# Patient Record
Sex: FEMALE | Race: WHITE | HISPANIC OR LATINO | Employment: UNEMPLOYED | URBAN - METROPOLITAN AREA
[De-identification: names, ages, dates, MRNs, and addresses within clinical notes are randomized per-mention and may not be internally consistent; named-entity substitution may affect disease eponyms.]

---
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
## 2020-01-22 ASSESSMENT — ENCOUNTER SYMPTOMS
NAUSEA: 0
DIARRHEA: 0
VOMITING: 0
FEVER: 0
HEADACHES: 0
ABDOMINAL PAIN: 1
SHORTNESS OF BREATH: 0

## 2020-04-10 ENCOUNTER — GYNECOLOGY VISIT (OUTPATIENT)
Dept: OBGYN | Facility: CLINIC | Age: 20
End: 2020-04-10
Payer: COMMERCIAL

## 2020-04-10 VITALS — DIASTOLIC BLOOD PRESSURE: 69 MMHG | WEIGHT: 198 LBS | BODY MASS INDEX: 31.96 KG/M2 | SYSTOLIC BLOOD PRESSURE: 135 MMHG

## 2020-04-10 DIAGNOSIS — N91.2 AMENORRHEA: ICD-10-CM

## 2020-04-10 PROCEDURE — 76857 US EXAM PELVIC LIMITED: CPT | Performed by: OBSTETRICS & GYNECOLOGY

## 2020-04-10 PROCEDURE — 99214 OFFICE O/P EST MOD 30 MIN: CPT | Mod: 25 | Performed by: OBSTETRICS & GYNECOLOGY

## 2020-04-10 NOTE — PROGRESS NOTES
CC: Missed menses    Jodee Marin is a 19 y.o.  who presents presents due to missed menses. Patient's last menstrual period was 2020.  She was not using anything for birthcontrol.  This is not a planned but is a desired pregnancy.   Reports she has been feeling good thus far in pregnancy. She reports nausea- no vomiting, reports headache, denies dysuria, denies  vaginal bleeding/spotting, and denies contractions/cramping.    Partner: Rayo    Review of systems:  Pertinent positives documented in HPI and all other systems reviewed & are negative    GYN History:  Patient's last menstrual period was 2020. Menarche @13.  Menses regular, lasting 5 days, not particularly heavy.  Only 19, never had a pap!  No history of cone biopsy, LEEP or any other cervical, uterine or gynecologic surgery. No history of sexually transmitted diseases.  Currently sexually active with her partner.      OB History:    OB History    Para Term  AB Living   2 1 1     1   SAB TAB Ectopic Molar Multiple Live Births           0 1      # Outcome Date GA Lbr Wicho/2nd Weight Sex Delivery Anes PTL Lv   2 Current            1 Term 19 39w2d  3.595 kg (7 lb 14.8 oz) M Vag-Spont EPI N NÉSTOR       All PMH, PSH, allergies, social history and FH reviewed and updated today:  Past Medical History:  History reviewed. No pertinent past medical history.    Past Surgical History:  History reviewed. No pertinent surgical history.    Medications:   Current Outpatient Medications Ordered in Epic   Medication Sig Dispense Refill   • ibuprofen (MOTRIN) 600 MG Tab Take 1 Tab by mouth every 6 hours as needed. 30 Tab 0   • Prenatal MV-Min-Fe Fum-FA-DHA (PRENATAL 1 PO) Take 1 tablet by mouth every day.       No current [facility]-ordered facility-administered medications on file.        Allergies: Patient has no known allergies.    Social History:  Social History     Socioeconomic History   • Marital status: Single     Spouse name: Not  on file   • Number of children: Not on file   • Years of education: Not on file   • Highest education level: Not on file   Occupational History   • Not on file   Social Needs   • Financial resource strain: Not on file   • Food insecurity     Worry: Not on file     Inability: Not on file   • Transportation needs     Medical: Not on file     Non-medical: Not on file   Tobacco Use   • Smoking status: Never Smoker   • Smokeless tobacco: Never Used   Substance and Sexual Activity   • Alcohol use: No   • Drug use: No   • Sexual activity: Never   Lifestyle   • Physical activity     Days per week: Not on file     Minutes per session: Not on file   • Stress: Not on file   Relationships   • Social connections     Talks on phone: Not on file     Gets together: Not on file     Attends Orthodox service: Not on file     Active member of club or organization: Not on file     Attends meetings of clubs or organizations: Not on file     Relationship status: Not on file   • Intimate partner violence     Fear of current or ex partner: Not on file     Emotionally abused: Not on file     Physically abused: Not on file     Forced sexual activity: Not on file   Other Topics Concern   • Behavioral problems Not Asked   • Interpersonal relationships Not Asked   • Sad or not enjoying activities Not Asked   • Suicidal thoughts Not Asked   • Poor school performance Not Asked   • Reading difficulties Not Asked   • Speech difficulties Not Asked   • Writing difficulties Not Asked   • Inadequate sleep Not Asked   • Excessive TV viewing Not Asked   • Excessive video game use Not Asked   • Inadequate exercise Not Asked   • Sports related Not Asked   • Poor diet Not Asked   • Family concerns for drug/alcohol abuse Not Asked   • Poor oral hygiene Not Asked   • Bike safety Not Asked   • Family concerns vehicle safety Not Asked   Social History Narrative   • Not on file       Family History:  Family History   Problem Relation Age of Onset   • No Known  Problems Mother    • No Known Problems Father            Objective:   Vitals:  /69   Wt 89.8 kg (198 lb)   Body mass index is 31.96 kg/m². (Goal BM I>18 <25)  Exam:  General: appears stated age, is in no apparent distress, is well developed and well nourished  Head: normocephalic, non-tender  Neck: no thyromegaly  Abdomen: Bowel sounds positive, nondistended, soft, nontender x4, no rebound or guarding. No organomegaly. No masses.   Female GYN: normal female external genitalia without lesions  Skin: No rashes, or ulcers or lesions seen  Psychiatric: appropriate affect, alert and oriented x3, intact judgment and insight.    Procedure:  Abdominal US performed by me and per my read:    Indication: amenorrhea.     Findings: prado intrauterine pregnancy @ 12w6d by CRL.  Positive fetal cardiac activity @ 168 BPM. No adnexal masses. No free fluid in the cul-de-sac.    Impression: viable IUP @ 12w6d.  DAKOTAH by US of 10/17/20.    No results found for this or any previous visit (from the past 336 hour(s)).   Pregnancy exam/test positive    A/P:   19 y.o.  here for amenorrhea  1. Amenorrhea         #Missed menses - amenorrhea due to pregnancy.  US today is c/w LMP. DAKOTAH of 10/14/20.  Discussed pregnancy with patient who is happy.    --Normal pregnancy s/s discussed  --Advised prenatal vitamins, healthy well rounded diet, adequate hydration, and continued exercise.    #Cervical cancer screening not indicated.    #Will order prenatal labs and any additional imaging/testing needed at new OB visit  #SAB precautions discussed.  #Follow up in 2-4 weeks for new OB visit.    30 minutes were spent in face-to-face patient contact with patient, greater than 50% of which was was spent in counseling and coordination of care for her newly diagnosed pregnancy including medical and surgical options of care.    NAYA

## 2020-04-24 ENCOUNTER — HOSPITAL ENCOUNTER (OUTPATIENT)
Facility: MEDICAL CENTER | Age: 20
End: 2020-04-24
Attending: OBSTETRICS & GYNECOLOGY
Payer: COMMERCIAL

## 2020-04-24 ENCOUNTER — INITIAL PRENATAL (OUTPATIENT)
Dept: OBGYN | Facility: CLINIC | Age: 20
End: 2020-04-24
Payer: COMMERCIAL

## 2020-04-24 VITALS — BODY MASS INDEX: 31.96 KG/M2 | WEIGHT: 198 LBS

## 2020-04-24 DIAGNOSIS — Z34.80 SUPERVISION OF OTHER NORMAL PREGNANCY, ANTEPARTUM: ICD-10-CM

## 2020-04-24 LAB
C TRACH DNA SPEC QL NAA+PROBE: NEGATIVE
N GONORRHOEA DNA SPEC QL NAA+PROBE: NEGATIVE
SPECIMEN SOURCE: NORMAL

## 2020-04-24 PROCEDURE — 87591 N.GONORRHOEAE DNA AMP PROB: CPT

## 2020-04-24 PROCEDURE — 59401 PR NEW OB VISIT: CPT | Performed by: OBSTETRICS & GYNECOLOGY

## 2020-04-24 PROCEDURE — 87491 CHLMYD TRACH DNA AMP PROBE: CPT

## 2020-04-24 NOTE — PROGRESS NOTES
C/c: new OB    HPI:  The patient is a 19 y.o.  at 15-2 weeks here for new OB visit.    She presents for her new obstetric visit.    She denies fetal movement, denies vaginal bleeding, denies leakage of fluid, denies contractions.     She denies nausea/vomiting, headaches, or urinary symptoms.      LMP was 2020.   Menarche 12yo, monthly, lasting 5 days.    Last pap: not indicated until 22yo.     OB History    Para Term  AB Living   2 1 1     1   SAB TAB Ectopic Molar Multiple Live Births           0 1      # Outcome Date GA Lbr Wicho/2nd Weight Sex Delivery Anes PTL Lv   2 Current            1 Term 19 39w2d  3.595 kg (7 lb 14.8 oz) M Vag-Spont EPI N NÉSTOR     No past medical history on file.  No past surgical history on file.  Social History     Socioeconomic History   • Marital status: Single     Spouse name: Not on file   • Number of children: Not on file   • Years of education: Not on file   • Highest education level: Not on file   Occupational History   • Not on file   Social Needs   • Financial resource strain: Not on file   • Food insecurity     Worry: Not on file     Inability: Not on file   • Transportation needs     Medical: Not on file     Non-medical: Not on file   Tobacco Use   • Smoking status: Never Smoker   • Smokeless tobacco: Never Used   Substance and Sexual Activity   • Alcohol use: No   • Drug use: No   • Sexual activity: Yes   Lifestyle   • Physical activity     Days per week: Not on file     Minutes per session: Not on file   • Stress: Not on file   Relationships   • Social connections     Talks on phone: Not on file     Gets together: Not on file     Attends Roman Catholic service: Not on file     Active member of club or organization: Not on file     Attends meetings of clubs or organizations: Not on file     Relationship status: Not on file   • Intimate partner violence     Fear of current or ex partner: Not on file     Emotionally abused: Not on file     Physically  abused: Not on file     Forced sexual activity: Not on file   Other Topics Concern   • Behavioral problems Not Asked   • Interpersonal relationships Not Asked   • Sad or not enjoying activities Not Asked   • Suicidal thoughts Not Asked   • Poor school performance Not Asked   • Reading difficulties Not Asked   • Speech difficulties Not Asked   • Writing difficulties Not Asked   • Inadequate sleep Not Asked   • Excessive TV viewing Not Asked   • Excessive video game use Not Asked   • Inadequate exercise Not Asked   • Sports related Not Asked   • Poor diet Not Asked   • Family concerns for drug/alcohol abuse Not Asked   • Poor oral hygiene Not Asked   • Bike safety Not Asked   • Family concerns vehicle safety Not Asked   Social History Narrative   • Not on file     Family History   Problem Relation Age of Onset   • No Known Problems Mother    • No Known Problems Father      Allergies:   Allergies as of 2020   • (No Known Allergies)       PE:    Wt 89.8 kg (198 lb)     General: no apparent distress, is well developed and well nourished  Head: normocephalic, atraumatic  Neck: neck is supple, non-tender, no thyromegaly, full range of motion  CVS: regular rate and rhythm, no peripheral edema  Lungs: Normal respiratory effort. Clear to auscultation bilaterally  Abdomen: Bowel sounds positive, nondistended, soft, nontender x4, no rebound or guarding.  Female GYN: normal female external genitalia without lesions  vaginal discharge noted, normal vagina and normal vaginal tone, normal cervix, normal uterus, size and consistency  Skin: No rashes, or ulcers or lesions seen  Psychiatric: Patient shows appropriate affect, is alert and oriented x3, intact judgment and insight.    A/P:  19 y.o.  here for new OB visit.  Patient's last menstrual period was 2020..  She is here for her new obstetric visit.    DATING: 10/14/2020 which is consistent with LMP     1. Supervision of other normal pregnancy, antepartum   Chlamydia/GC PCR Urine Or Swab    PREG CNTR PRENATAL PN    US-OB 2ND 3RD TRI COMPLETE    AFP TETRA    CANCELED: AFP TETRA       - Ultrasound for dates and anatomy at 18-22 weeks.  - Screening: second trimester screening for Down Syndrome and neural tube defects offered. Discussed SM, CF, and Hg electrophoresis screening with patient - advised to call insurance for cost of testing. Patient accepts quad screening  - Referral to [facility] not indicated.  - New prenatal labs given.  - NOB packet given with ACOG prenatal book.  - Increase water intake and encouraged healthy nutrition. Encouraged moderate exercise may continue into final trimester.   - Continue prenatal vitamins.  - Follow-up in 6 weeks.   - SAB precautions educated.  - Oriented to practice model and number of expected visits in the future.  - Advised to get flu vaccine during flu season

## 2020-05-12 ENCOUNTER — HOSPITAL ENCOUNTER (OUTPATIENT)
Dept: LAB | Facility: MEDICAL CENTER | Age: 20
End: 2020-05-12
Attending: OBSTETRICS & GYNECOLOGY
Payer: COMMERCIAL

## 2020-05-12 DIAGNOSIS — Z34.80 SUPERVISION OF OTHER NORMAL PREGNANCY, ANTEPARTUM: ICD-10-CM

## 2020-05-12 LAB
ABO GROUP BLD: NORMAL
APPEARANCE UR: ABNORMAL
BACTERIA #/AREA URNS HPF: ABNORMAL /HPF
BASOPHILS # BLD AUTO: 0.4 % (ref 0–1.8)
BASOPHILS # BLD: 0.04 K/UL (ref 0–0.12)
BILIRUB UR QL STRIP.AUTO: NEGATIVE
BLD GP AB SCN SERPL QL: NORMAL
COLOR UR: YELLOW
EOSINOPHIL # BLD AUTO: 0.26 K/UL (ref 0–0.51)
EOSINOPHIL NFR BLD: 2.3 % (ref 0–6.9)
EPI CELLS #/AREA URNS HPF: ABNORMAL /HPF
ERYTHROCYTE [DISTWIDTH] IN BLOOD BY AUTOMATED COUNT: 46.1 FL (ref 35.9–50)
GLUCOSE UR STRIP.AUTO-MCNC: NEGATIVE MG/DL
HBV SURFACE AG SER QL: ABNORMAL
HCT VFR BLD AUTO: 38.1 % (ref 37–47)
HGB BLD-MCNC: 13 G/DL (ref 12–16)
HIV 1+2 AB+HIV1 P24 AG SERPL QL IA: NORMAL
HYALINE CASTS #/AREA URNS LPF: ABNORMAL /LPF
IMM GRANULOCYTES # BLD AUTO: 0.03 K/UL (ref 0–0.11)
IMM GRANULOCYTES NFR BLD AUTO: 0.3 % (ref 0–0.9)
KETONES UR STRIP.AUTO-MCNC: ABNORMAL MG/DL
LEUKOCYTE ESTERASE UR QL STRIP.AUTO: ABNORMAL
LYMPHOCYTES # BLD AUTO: 2.77 K/UL (ref 1–4.8)
LYMPHOCYTES NFR BLD: 24.9 % (ref 22–41)
MCH RBC QN AUTO: 29.8 PG (ref 27–33)
MCHC RBC AUTO-ENTMCNC: 34.1 G/DL (ref 33.6–35)
MCV RBC AUTO: 87.4 FL (ref 81.4–97.8)
MICRO URNS: ABNORMAL
MONOCYTES # BLD AUTO: 0.45 K/UL (ref 0–0.85)
MONOCYTES NFR BLD AUTO: 4 % (ref 0–13.4)
NEUTROPHILS # BLD AUTO: 7.57 K/UL (ref 2–7.15)
NEUTROPHILS NFR BLD: 68.1 % (ref 44–72)
NITRITE UR QL STRIP.AUTO: NEGATIVE
NRBC # BLD AUTO: 0 K/UL
NRBC BLD-RTO: 0 /100 WBC
PH UR STRIP.AUTO: 5.5 [PH] (ref 5–8)
PLATELET # BLD AUTO: 255 K/UL (ref 164–446)
PMV BLD AUTO: 9.7 FL (ref 9–12.9)
PROT UR QL STRIP: NEGATIVE MG/DL
RBC # BLD AUTO: 4.36 M/UL (ref 4.2–5.4)
RBC # URNS HPF: ABNORMAL /HPF
RBC UR QL AUTO: NEGATIVE
RH BLD: NORMAL
RUBV AB SER QL: >500 IU/ML
SP GR UR STRIP.AUTO: 1.03
TREPONEMA PALLIDUM IGG+IGM AB [PRESENCE] IN SERUM OR PLASMA BY IMMUNOASSAY: ABNORMAL
UROBILINOGEN UR STRIP.AUTO-MCNC: 0.2 MG/DL
WBC # BLD AUTO: 11.1 K/UL (ref 4.8–10.8)
WBC #/AREA URNS HPF: ABNORMAL /HPF

## 2020-05-12 PROCEDURE — 86901 BLOOD TYPING SEROLOGIC RH(D): CPT

## 2020-05-12 PROCEDURE — 81001 URINALYSIS AUTO W/SCOPE: CPT

## 2020-05-12 PROCEDURE — 86762 RUBELLA ANTIBODY: CPT

## 2020-05-12 PROCEDURE — 87389 HIV-1 AG W/HIV-1&-2 AB AG IA: CPT

## 2020-05-12 PROCEDURE — 87340 HEPATITIS B SURFACE AG IA: CPT

## 2020-05-12 PROCEDURE — 86900 BLOOD TYPING SEROLOGIC ABO: CPT

## 2020-05-12 PROCEDURE — 87086 URINE CULTURE/COLONY COUNT: CPT

## 2020-05-12 PROCEDURE — 81511 FTL CGEN ABNOR FOUR ANAL: CPT

## 2020-05-12 PROCEDURE — 86780 TREPONEMA PALLIDUM: CPT

## 2020-05-12 PROCEDURE — 36415 COLL VENOUS BLD VENIPUNCTURE: CPT

## 2020-05-12 PROCEDURE — 86850 RBC ANTIBODY SCREEN: CPT

## 2020-05-12 PROCEDURE — 85025 COMPLETE CBC W/AUTO DIFF WBC: CPT

## 2020-05-14 LAB
BACTERIA UR CULT: NORMAL
SIGNIFICANT IND 70042: NORMAL
SITE SITE: NORMAL
SOURCE SOURCE: NORMAL

## 2020-05-15 LAB
# FETUSES US: NORMAL
AFP MOM SERPL: 0.55
AFP SERPL-MCNC: 19 NG/ML
AGE - REPORTED: 20.2 YR
CURRENT SMOKER: NO
FAMILY MEMBER DISEASES HX: NO
GA METHOD: NORMAL
GA: NORMAL WK
HCG MOM SERPL: 0.59
HCG SERPL-ACNC: NORMAL IU/L
HX OF HEREDITARY DISORDERS: NO
IDDM PATIENT QL: NO
INHIBIN A MOM SERPL: 0.75
INHIBIN A SERPL-MCNC: 110 PG/ML
INTEGRATED SCN PATIENT-IMP: NORMAL
PATHOLOGY STUDY: NORMAL
SPECIMEN DRAWN SERPL: NORMAL
U ESTRIOL MOM SERPL: 0.7
U ESTRIOL SERPL-MCNC: 1 NG/ML

## 2020-05-28 ENCOUNTER — HOSPITAL ENCOUNTER (OUTPATIENT)
Dept: RADIOLOGY | Facility: MEDICAL CENTER | Age: 20
End: 2020-05-28
Attending: OBSTETRICS & GYNECOLOGY
Payer: COMMERCIAL

## 2020-05-28 DIAGNOSIS — Z34.80 SUPERVISION OF OTHER NORMAL PREGNANCY, ANTEPARTUM: ICD-10-CM

## 2020-05-28 PROCEDURE — 76805 OB US >/= 14 WKS SNGL FETUS: CPT

## 2020-05-29 DIAGNOSIS — Z34.80 SUPERVISION OF OTHER NORMAL PREGNANCY, ANTEPARTUM: ICD-10-CM

## 2020-06-02 ENCOUNTER — ROUTINE PRENATAL (OUTPATIENT)
Dept: OBGYN | Facility: CLINIC | Age: 20
End: 2020-06-02
Payer: COMMERCIAL

## 2020-06-02 VITALS — SYSTOLIC BLOOD PRESSURE: 123 MMHG | DIASTOLIC BLOOD PRESSURE: 84 MMHG | WEIGHT: 208 LBS | BODY MASS INDEX: 33.57 KG/M2

## 2020-06-02 DIAGNOSIS — Z3A.20 20 WEEKS GESTATION OF PREGNANCY: ICD-10-CM

## 2020-06-02 PROCEDURE — 90040 PR PRENATAL FOLLOW UP: CPT | Performed by: OBSTETRICS & GYNECOLOGY

## 2020-06-02 NOTE — PROGRESS NOTES
Chief complaint: Return OB visit    S: Pt presents for routine OB follow up.  No contractions, vaginal bleeding, or leaking fluids. Good fetal movement.    Complaints    Questions answered.    O: /84   Wt 94.3 kg (208 lb)   LMP 2020   BMI 33.57 kg/m²   Patients' weight gain, fluid intake and exercise level discussed.  Vitals, fundal height , fetal position, and FHR reviewed on flowsheet    Lab:No results found for this or any previous visit (from the past 336 hour(s)).    A/P:  19 y.o.  at 20w6d presents for routine obstetric follow-up.  Size equals dates and/or scan    1.  Continue prenatal vitamins.  2.  Begin kick counts at 28 weeks.  3.  Exercise at least 30 minutes daily.  4.  Drink at least 2 Liters of water daily  5.  Follow-up in 4 weeks.    Labs and ultrasound discussed with patient.  Will have repeat ultrasound secondary to poor visualization of facial structures.    All questions answered

## 2020-06-12 ENCOUNTER — HOSPITAL ENCOUNTER (OUTPATIENT)
Dept: RADIOLOGY | Facility: MEDICAL CENTER | Age: 20
End: 2020-06-12
Attending: OBSTETRICS & GYNECOLOGY
Payer: COMMERCIAL

## 2020-06-12 DIAGNOSIS — Z34.80 SUPERVISION OF OTHER NORMAL PREGNANCY, ANTEPARTUM: ICD-10-CM

## 2020-06-12 PROCEDURE — 76815 OB US LIMITED FETUS(S): CPT

## 2020-07-08 ENCOUNTER — ROUTINE PRENATAL (OUTPATIENT)
Dept: OBGYN | Facility: CLINIC | Age: 20
End: 2020-07-08
Payer: COMMERCIAL

## 2020-07-08 VITALS — SYSTOLIC BLOOD PRESSURE: 125 MMHG | DIASTOLIC BLOOD PRESSURE: 67 MMHG | WEIGHT: 215 LBS | BODY MASS INDEX: 34.7 KG/M2

## 2020-07-08 DIAGNOSIS — Z3A.26 26 WEEKS GESTATION OF PREGNANCY: ICD-10-CM

## 2020-07-08 PROCEDURE — 90040 PR PRENATAL FOLLOW UP: CPT | Performed by: OBSTETRICS & GYNECOLOGY

## 2020-07-08 NOTE — PROGRESS NOTES
MERCEDES:  26w0d    Pt reports doing well.  Denies vaginal bleeding, contractions, LOF.  Reports +FM.    LMP 2020   gen: AAO, NAD  FHTs: 140  FH: 28    A/P: 19 y.o.  @ 26w0d  gume by LMP c/w 10 wk scan. O+/ ab neg, RI, HepB neg, T.pall neg, UA neg.   Quad screening neg.  --short interval pregnancy!  Ultrasounds:   20 at 19w6d. EFW 320g, (32%tile), post placenta without previa, 3VC, normal insertion. COY normal, normal anatomy survey but lips and nose not seen. Repeat US  showed normal structures.  Immunizations: tdap [ ]  3rd tri labs: [ ] - ordered   GBS: [ ]  Delivery Plan: [ ]  Accepting of transfusion:[ ]  PPBCM: [ ] - READDRESS

## 2020-07-08 NOTE — PROGRESS NOTES
Pt here today for OB follow up @ 26w0d  Pt states denies VB & LOF  Reports +FM  Good # [phone]   Pharmacy Confirmed.

## 2020-07-15 ENCOUNTER — TELEPHONE (OUTPATIENT)
Dept: OBGYN | Facility: CLINIC | Age: 20
End: 2020-07-15

## 2020-07-15 NOTE — TELEPHONE ENCOUNTER
Pt LM on VM wanting to discuss a concern  Called pt back, reports she has been having white discharge with foul odor, thinks she may have BV again.  Offered appt for tomorrow at 10:30, pt agreed and scheduled

## 2020-07-30 ENCOUNTER — HOSPITAL ENCOUNTER (OUTPATIENT)
Facility: MEDICAL CENTER | Age: 20
End: 2020-07-30
Attending: OBSTETRICS & GYNECOLOGY | Admitting: OBSTETRICS & GYNECOLOGY
Payer: COMMERCIAL

## 2020-07-30 ENCOUNTER — APPOINTMENT (OUTPATIENT)
Dept: RADIOLOGY | Facility: MEDICAL CENTER | Age: 20
End: 2020-07-30
Attending: OBSTETRICS & GYNECOLOGY
Payer: COMMERCIAL

## 2020-07-30 ENCOUNTER — TELEPHONE (OUTPATIENT)
Dept: OBGYN | Facility: CLINIC | Age: 20
End: 2020-07-30

## 2020-07-30 VITALS
BODY MASS INDEX: 34.55 KG/M2 | HEIGHT: 66 IN | SYSTOLIC BLOOD PRESSURE: 109 MMHG | DIASTOLIC BLOOD PRESSURE: 55 MMHG | OXYGEN SATURATION: 96 % | TEMPERATURE: 97 F | WEIGHT: 215 LBS | RESPIRATION RATE: 18 BRPM | HEART RATE: 69 BPM

## 2020-07-30 LAB
APPEARANCE UR: CLEAR
BASOPHILS # BLD AUTO: 0.2 % (ref 0–1.8)
BASOPHILS # BLD: 0.02 K/UL (ref 0–0.12)
CANDIDA DNA VAG QL PROBE+SIG AMP: NEGATIVE
COLOR UR AUTO: ABNORMAL
EOSINOPHIL # BLD AUTO: 0.1 K/UL (ref 0–0.51)
EOSINOPHIL NFR BLD: 1.1 % (ref 0–6.9)
ERYTHROCYTE [DISTWIDTH] IN BLOOD BY AUTOMATED COUNT: 44.8 FL (ref 35.9–50)
G VAGINALIS DNA VAG QL PROBE+SIG AMP: POSITIVE
GLUCOSE UR QL STRIP.AUTO: NEGATIVE MG/DL
HCT VFR BLD AUTO: 37.1 % (ref 37–47)
HGB BLD-MCNC: 12.3 G/DL (ref 12–16)
IMM GRANULOCYTES # BLD AUTO: 0.04 K/UL (ref 0–0.11)
IMM GRANULOCYTES NFR BLD AUTO: 0.5 % (ref 0–0.9)
KETONES UR QL STRIP.AUTO: NEGATIVE MG/DL
LEUKOCYTE ESTERASE UR QL STRIP.AUTO: ABNORMAL
LYMPHOCYTES # BLD AUTO: 2.48 K/UL (ref 1–4.8)
LYMPHOCYTES NFR BLD: 28.1 % (ref 22–41)
MCH RBC QN AUTO: 30.2 PG (ref 27–33)
MCHC RBC AUTO-ENTMCNC: 33.2 G/DL (ref 33.6–35)
MCV RBC AUTO: 91.2 FL (ref 81.4–97.8)
MONOCYTES # BLD AUTO: 0.56 K/UL (ref 0–0.85)
MONOCYTES NFR BLD AUTO: 6.3 % (ref 0–13.4)
NEUTROPHILS # BLD AUTO: 5.64 K/UL (ref 2–7.15)
NEUTROPHILS NFR BLD: 63.8 % (ref 44–72)
NITRITE UR QL STRIP.AUTO: NEGATIVE
NRBC # BLD AUTO: 0 K/UL
NRBC BLD-RTO: 0 /100 WBC
PH UR STRIP.AUTO: 7 [PH] (ref 5–8)
PLATELET # BLD AUTO: 203 K/UL (ref 164–446)
PMV BLD AUTO: 9.7 FL (ref 9–12.9)
PROT UR QL STRIP: NEGATIVE MG/DL
RBC # BLD AUTO: 4.07 M/UL (ref 4.2–5.4)
RBC UR QL AUTO: ABNORMAL
SP GR UR STRIP.AUTO: 1.02 (ref 1–1.03)
T VAGINALIS DNA VAG QL PROBE+SIG AMP: NEGATIVE
WBC # BLD AUTO: 8.8 K/UL (ref 4.8–10.8)

## 2020-07-30 PROCEDURE — 76816 OB US FOLLOW-UP PER FETUS: CPT

## 2020-07-30 PROCEDURE — 36415 COLL VENOUS BLD VENIPUNCTURE: CPT

## 2020-07-30 PROCEDURE — 59025 FETAL NON-STRESS TEST: CPT | Mod: 26 | Performed by: OBSTETRICS & GYNECOLOGY

## 2020-07-30 PROCEDURE — 87591 N.GONORRHOEAE DNA AMP PROB: CPT

## 2020-07-30 PROCEDURE — 87660 TRICHOMONAS VAGIN DIR PROBE: CPT

## 2020-07-30 PROCEDURE — 99214 OFFICE O/P EST MOD 30 MIN: CPT | Mod: 25 | Performed by: OBSTETRICS & GYNECOLOGY

## 2020-07-30 PROCEDURE — 59025 FETAL NON-STRESS TEST: CPT

## 2020-07-30 PROCEDURE — 87480 CANDIDA DNA DIR PROBE: CPT

## 2020-07-30 PROCEDURE — 81002 URINALYSIS NONAUTO W/O SCOPE: CPT

## 2020-07-30 PROCEDURE — 87491 CHLMYD TRACH DNA AMP PROBE: CPT

## 2020-07-30 PROCEDURE — 85025 COMPLETE CBC W/AUTO DIFF WBC: CPT

## 2020-07-30 PROCEDURE — 87510 GARDNER VAG DNA DIR PROBE: CPT

## 2020-07-30 ASSESSMENT — PAIN SCALES - GENERAL: PAINLEVEL: 0 - NO PAIN

## 2020-07-30 NOTE — PROGRESS NOTES
Pt is a ; DAKOTAH of 10/14; making her 29w1d. Pt here reporting VB since last evening after having intercourse. Pt reports she first noticed the bleeding last evening so she called here and spoke with one of our nurses. The nurse told her to put on a maxi pad and monitor her bleeding. If she soaked the pad in an hour or continued to bleed throught the night she was told to come in to be evaluated. Pt reported waking up this morning and feeling a gush. When pt went to evaluate she reported a good amount of red blood on her pad and decided to come in and get evaluated. Pt denies LOF, UCS and reports +FM. EFM and TOCO applied. VSS.     [facility] Resident Yvette Ngo notified. WIll consult with Dr Horn and update RN.        Orders received for CBC and a OB growth US with rule out abruption. Will update MD with results.     1545 Us at bedside. Report given to Tamica VERDUZCO.

## 2020-07-30 NOTE — TELEPHONE ENCOUNTER
Pt LM on VM c/o vaginal bleeding  Called pt back (pt is at L&D now being monitored) pt states she is having bleeding when she wipes and already being seen at L&D

## 2020-07-30 NOTE — H&P
[facility] LABOR AND DELIVERY HISTORY AND PHYSICAL    PATIENT ID:  NAME:  Jodee Marin  MRN:               [id number]  YOB: 2000    CC:  Jodee Marin is a 19 y.o. female  at 29w1d who presents with episodes of vaginal bleeding after sexual intercourse    HPI:  Jodee Marin is a 19 y.o. female  at 29w1d. Patient's last menstrual period was 2020. Estimated Date of Delivery: 10/14/20.  Patient noted increased discharge over the past 2 weeks, was unable to make an earlier appointment until this coming week.  Had sex last night, noted a gush of blood after sex, called labor and delivery and was told to place a pad and monitor the bleeding.  If persisted to come into the hospital.  She wore a pad and did not have much bleeding afterwards, then woke up this morning and had a gush of blood again.  It happened 1 more time before she came to labor and delivery for evaluation.  Patient presents complaining of no uterine contractions, with no loss of fluid.  Positive fetal movement.  Positive vaginal bleeding.  Pregnancy has been uncomplicated.    ROS: Patient denies any fever chills, nausea, vomiting, headache, chest pain, shortness of breath, or dysuria or unusual swelling of hands or feet.     Prenatal Care: Obtained at [facility], 1st visit 2020 with 3 total visits.  Third trimester BPs were approximately 124/74.  Total weight gain 7.7 kg during the pregnancy.    Prenatal Labs:   HepBsAg: Non-Reactive HIV: Non-Reactive Rubella: Immune   RPR: Non-Reactive PAP: N/A GBS: Negative   GC/CT: Neg/Neg  Quad Screen: N/A   No results for input(s): WBC, RBC, HEMOGLOBIN, HEMATOCRIT, MCV, MCH, RDW, PLATELETCT, MPV, NEUTSPOLYS, LYMPHOCYTES, MONOCYTES, EOSINOPHILS, BASOPHILS, RBCMORPHOLO in the last 72 hours.  No results for input(s): SODIUM, POTASSIUM, CHLORIDE, CO2, GLUCOSE, BUN, CPKTOTAL in the last 72 hours.       IMAGING:  OB ultrasound: Posterior Placenta without Previa on Ultrasound from  2020      POB Hx:  OB History    Para Term  AB Living   2 1 1     1   SAB TAB Ectopic Molar Multiple Live Births           0 1      # Outcome Date GA Lbr [place]/2nd Weight Sex Delivery Anes PTL Lv   2 Current            1 Term 19 39w2d  3.595 kg (7 lb 14.8 oz) M Vag-Spont EPI N NÉSTOR       PMH/Problem List:    No past medical history on file.  Patient Active Problem List    Diagnosis Date Noted   • 20 weeks gestation of pregnancy 2020       Current Outpatient Medications:  No current facility-administered medications on file prior to encounter.      Current Outpatient Medications on File Prior to Encounter   Medication Sig Dispense Refill   • ibuprofen (MOTRIN) 600 MG Tab Take 1 Tab by mouth every 6 hours as needed. (Patient not taking: Reported on 2020) 30 Tab 0   • Prenatal MV-Min-Fe Fum-FA-DHA (PRENATAL 1 PO) Take 1 tablet by mouth every day.         PSH:    No past surgical history on file.    Allergies:   No Known Allergies    SH:  Social History     Socioeconomic History   • Marital status: Single     Spouse name: Not on file   • Number of children: Not on file   • Years of education: Not on file   • Highest education level: Not on file   Occupational History   • Not on file   Social Needs   • Financial resource strain: Not on file   • Food insecurity     Worry: Not on file     Inability: Not on file   • Transportation needs     Medical: Not on file     Non-medical: Not on file   Tobacco Use   • Smoking status: Never Smoker   • Smokeless tobacco: Never Used   Substance and Sexual Activity   • Alcohol use: No   • Drug use: No   • Sexual activity: Yes   Lifestyle   • Physical activity     Days per week: Not on file     Minutes per session: Not on file   • Stress: Not on file   Relationships   • Social connections     Talks on phone: Not on file     Gets together: Not on file     Attends [employer] service: Not on file     Active member of club or organization: Not on file     Attends  "meetings of clubs or organizations: Not on file     Relationship status: Not on file   • Intimate partner violence     Fear of current or ex partner: Not on file     Emotionally abused: Not on file     Physically abused: Not on file     Forced sexual activity: Not on file   Other Topics Concern   • Behavioral problems Not Asked   • Interpersonal relationships Not Asked   • Sad or not enjoying activities Not Asked   • Suicidal thoughts Not Asked   • Poor school performance Not Asked   • Reading difficulties Not Asked   • Speech difficulties Not Asked   • Writing difficulties Not Asked   • Inadequate sleep Not Asked   • Excessive TV viewing Not Asked   • Excessive video game use Not Asked   • Inadequate exercise Not Asked   • Sports related Not Asked   • Poor diet Not Asked   • Family concerns for drug/alcohol abuse Not Asked   • Poor oral hygiene Not Asked   • Bike safety Not Asked   • Family concerns vehicle safety Not Asked   Social History Narrative   • Not on file         PHYSICAL EXAM:  Vitals:    07/30/20 1300   Weight: 97.5 kg (215 lb)   Height: 1.676 m (5' 6\")     No data recorded.    General: No acute distress, resting comfortably in bed.  HEENT: normocephalic, nontraumatic, PERRLA, EOMI  Cardiovascular: Heart RRR with no murmurs, rubs or gallops. Distal Pulses 2+  Respiratory: symmetric chest expansion, lungs CTA bilaterally with no wheezes rales or rhonci  Abdomen: gravid, nontender    Pelvic: Normal external genitalia normal vagina, cervix obscured by large amount of yellow frothy discharge, when cultures obtained, cervix was extremely friable and bled filling the vaginal vault, pressure was performed and 12 dossil swabs were used and 6 4x4 raytec to control bleeding, once silver nitrate and monsel's solution was applied to the cervix, it became hemostatic.  The cervix was observed and no bleeding was seen with excellent hemostasis.     Musculoskeletal: strength 5/5 in four extremities  Neuro: non focal " with no numbness, tingling or changes in sensation    Jodee Marin, a  at 29w1d with an DAKOTAH of 10/14/2020, by Last Menstrual Period, was seen at LABOR & DELIVERY [facility] for a nonstress test.    Nonstress Test  Reason for NST: Bleeding  Variability: Moderate  Decelerations: None  Accelerations: Yes  Acoustic Stimulator: No  Baseline: 135  Uterine Irritability: Yes  Contractions: Not present  Nonstress Test Interpretation  $ Nonstress Test Interpretation - Fetus A: Reactive  NST Interpreted By: MARIO Murphy RN  Indication for NST: vaginal bleeding    NST performed and per my read:    Reactive NST, cat. 1  TOCO monitor: some irritability       Us-ob Limited Growth Follow Up   Is The Patient Pregnant? Yes    Result Date: 2020 3:29 PM HISTORY/REASON FOR EXAM:  Vaginal bleeding; IUP 29.1 pt experiencing VB, needs growth scan and rule out abruption TECHNIQUE/EXAM DESCRIPTION: OB limited ultrasound. COMPARISON:  Obstetrical ultrasound 2020 FINDINGS: Fetal Lie:  Vertex LMP:  2020 Clinical DAKOTAH by LMP:  10/14/2020 Placenta (Location):  Posterior Placenta Previa: No Placental Grade: I Amniotic Fluid Volume:  COY = 23.46 cm Fetal Heart Rate:  143 bpm Cervical Length:  4.12 cm transabdominal No maternal adnexal mass is identified. Fetal Biometry BPD    7.42 cm, 29 weeks, 6 days HC    25.76 cm, 28 weeks, 0 days AC    25.21 cm, 29 weeks, 3 days Femur Length    5.27 cm, 28 weeks, 1 days Humerus Length    4.91 cm, 29 weeks, 0 days EGA by this US:  28 weeks, 6 days DAKOTAH by this US: 10/16/2020 DAKOTAH by 1st US:  10/16/2020 Estimated Fetal Weight:  1293 grams EFW Percentile: 27% Comments: No retroplacental hemorrhage is seen.     Single intrauterine pregnancy of an estimated gestational age of 28 weeks, 6 days with an estimated date of delivery of 10/16/2020. Dates are concordant No placental abruption is identified Upper normal COY, 23.5 cm INTERPRETING LOCATION: [facility], [facility], [facility]      A/P:  Intrauterine pregancy at 29w1d weeks here for episodes of vaginal bleeding after sexual intercourse with cervicitis       - Placental abruption ruled out with ultrasound, explained to the patient's there could be a smaller bleed that was not identified however given the cervix findings today do not think this is is likely  - Possibly could have cervicitis given the discharge and the friability of the cervix noted, explained to the patient that the combination of pregnancy and infection on the cervix can make it bleed when touched  - Recommend nothing in the vagina for the next week and return to the hospital if continues having significant vaginal bleeding  - Cramping may occur due to the irritation from the cervix, but if persistent return for rule out labor  - Gonorrhea and chlamydia as well as vaginal pathogens ordered, will follow up and treat as indicated in the office  -Okay for discharge home, follow-up closely next week in the office

## 2020-07-31 NOTE — PROGRESS NOTES
1545-report received from QI Villarreal RN, care assumed, POC discussed, US at bedside, cbc drawn  1700-TC Dr Horn, reviewed labs and US, states that she will be here to do spec exam when she can  1745-Dr Horn here, spec exam done, bleeding noted from cervix, bleeding corrected by Dr Horn  1815-discharge orders received  1830-pt discharged home with PTL precautions and instructions to come back if bleeding resumes after treatment, verbalized understanding, left ambulatory with SO

## 2020-08-07 ENCOUNTER — ROUTINE PRENATAL (OUTPATIENT)
Dept: OBGYN | Facility: CLINIC | Age: 20
End: 2020-08-07
Payer: COMMERCIAL

## 2020-08-07 VITALS — SYSTOLIC BLOOD PRESSURE: 125 MMHG | WEIGHT: 220 LBS | BODY MASS INDEX: 35.51 KG/M2 | DIASTOLIC BLOOD PRESSURE: 74 MMHG

## 2020-08-07 DIAGNOSIS — O09.893 SUPERVISION OF OTHER HIGH RISK PREGNANCIES, THIRD TRIMESTER: ICD-10-CM

## 2020-08-07 DIAGNOSIS — O46.93 VAGINAL BLEEDING IN PREGNANCY, THIRD TRIMESTER: ICD-10-CM

## 2020-08-07 DIAGNOSIS — N76.0 BV (BACTERIAL VAGINOSIS): ICD-10-CM

## 2020-08-07 DIAGNOSIS — B96.89 BV (BACTERIAL VAGINOSIS): ICD-10-CM

## 2020-08-07 PROCEDURE — 90040 PR PRENATAL FOLLOW UP: CPT | Performed by: OBSTETRICS & GYNECOLOGY

## 2020-08-07 RX ORDER — METRONIDAZOLE 7.5 MG/G
1 GEL VAGINAL
Qty: 1 EACH | Refills: 0 | Status: SHIPPED | OUTPATIENT
Start: 2020-08-07 | End: 2020-08-12

## 2020-08-07 NOTE — LETTER
"Count Your Baby's Movements  Another step to a healthy delivery    Jodee Marin              Dept: [phone]    How Many Weeks Pregnant? 30w2d    Date to Begin Countin2020              How to use this chart    One way for your physician to keep track of your baby's health is by knowing how often the baby moves (or \"kicks\") in your womb.  You can help your physician to do this by using this chart every day.    Every day, you should see how many hours it takes for your baby to move 10 times.  Start in the morning, as soon as you get up.    · First, write down the time your baby moves until you get to 10.  · Check off one box every time your baby moves until you get to 10.  · Write down the time you finished counting in the last column.  · Total how long it took to count up all 10 movements.  · Finally, fill in the box that shows how long this took.  After counting 10 movements, you no longer have to count any more that day.  The next morning, just start counting again as soon as you get up.    What should you call a \"movement\"?  It is hard to say, because it will feel different from one mother to another and from one pregnancy to the next.  The important thing is that you count the movements the same way throughout your pregnancy.  If you have more questions, you should ask your physician.    Count carefully every day!  SAMPLE:  Week 28    How many hours did it take to feel 10 movements?       Start  Time     1     2     3     4     5     6     7     8     9     10   Finish Time   Mon 8:20 ·  ·  ·  ·  ·  ·  ·  ·  ·  ·  11:40                  Sat               Sun                 IMPORTANT: You should contact your physician if it takes more than two hours for you to feel 10 movements.  Each morning, write down the time and start to count the movements of your baby.  Keep track by checking off one box every time you feel one movement.  When you have " "felt 10 \"kicks\", write down the time you finished counting in the last column.  Then fill in the   box (over the check adeline) for the number of hours it took.  Be sure to read the complete instructions on the previous page.            "

## 2020-08-07 NOTE — PROGRESS NOTES
S: Pt presents for routine OB follow up.  Patient is currently doing well without any complaints.  Reports good fetal movement.  No contractions, vaginal bleeding, or leakage of fluid.  Patient was seen a week ago on labor and delivery for profuse vaginal bleeding and was found to have a vascular bleeding in her cervix which was cauterized with Monsel solution.  Patient states she has not had any bleeding or discomfort since that time.  Questions answered.    O: /74   Wt 99.8 kg (220 lb)   LMP 01/08/2020   BMI 35.51 kg/m²   Patients' weight gain, fluid intake and exercise level discussed.  Vitals, fundal height , fetal position, and FHR reviewed on flowsheet    Lab:  Recent Results (from the past 336 hour(s))   POC UA    Collection Time: 07/30/20  2:55 PM   Result Value Ref Range    POC Color Bessie     POC Appearance Clear     POC Glucose Negative Negative mg/dL    POC Ketones Negative Negative mg/dL    POC Specific Gravity 1.020 1.005 - 1.030    POC Blood Large (A) Negative    POC Urine PH 7.0 5.0 - 8.0    POC Protein Negative Negative mg/dL    POC Nitrites Negative Negative    POC Leukocyte Esterase Moderate (A) Negative   CBC WITH DIFFERENTIAL    Collection Time: 07/30/20  4:09 PM   Result Value Ref Range    WBC 8.8 4.8 - 10.8 K/uL    RBC 4.07 (L) 4.20 - 5.40 M/uL    Hemoglobin 12.3 12.0 - 16.0 g/dL    Hematocrit 37.1 37.0 - 47.0 %    MCV 91.2 81.4 - 97.8 fL    MCH 30.2 27.0 - 33.0 pg    MCHC 33.2 (L) 33.6 - 35.0 g/dL    RDW 44.8 35.9 - 50.0 fL    Platelet Count 203 164 - 446 K/uL    MPV 9.7 9.0 - 12.9 fL    Neutrophils-Polys 63.80 44.00 - 72.00 %    Lymphocytes 28.10 22.00 - 41.00 %    Monocytes 6.30 0.00 - 13.40 %    Eosinophils 1.10 0.00 - 6.90 %    Basophils 0.20 0.00 - 1.80 %    Immature Granulocytes 0.50 0.00 - 0.90 %    Nucleated RBC 0.00 /100 WBC    Neutrophils (Absolute) 5.64 2.00 - 7.15 K/uL    Lymphs (Absolute) 2.48 1.00 - 4.80 K/uL    Monos (Absolute) 0.56 0.00 - 0.85 K/uL    Eos (Absolute)  0.10 0.00 - 0.51 K/uL    Baso (Absolute) 0.02 0.00 - 0.12 K/uL    Immature Granulocytes (abs) 0.04 0.00 - 0.11 K/uL    NRBC (Absolute) 0.00 K/uL   VAGINAL PATHOGENS DNA PANEL    Collection Time: 20  5:45 PM   Result Value Ref Range    Candida species DNA Probe Negative Negative    Trichamonas vaginalis DNA Probe Negative Negative    Gardnerella vaginalis DNA Probe POSITIVE (A) Negative   Chlamydia/GC PCR Urine Or Swab    Collection Time: 20  5:45 PM    Specimen: Genital   Result Value Ref Range    C. trachomatis by PCR Negative Negative    N. gonorrhoeae by PCR Negative Negative    Source Genital        A/P:  19 y.o.  at 30w2d presents for routine obstetric follow-up.  Doing well  Size equals dates   Patient had one episode of vaginal bleeding-bleeding has resolved  BV diagnosis discussed.  Treatment discussed-MetroGel prescribed.  Patient to continue pelvic rest until MetroGel therapy is completed.  Patient has not completed 28-week labs-lab slips reprinted and given.  Patient advised to complete labs within the next week    1.  Continue prenatal vitamins.  2.  Fetal kick counts daily discussed-instructions provided.  3.  Exercise at least 30 minutes daily.  4.  Drink at least 2L of water daily  5.  Pregnancy and  labor precautions educated.  6.  Follow-up in 2 weeks.  7.  GBS culture at 36 weeks discussed

## 2020-08-07 NOTE — PROGRESS NOTES
Pt here today for OB follow up @ 30w2d  Pt states denies VB and LOF   Reports +FM  Good # [phone]  Pharmacy Confirmed.  GABRIELE sheet given  Tdap next visit

## 2020-08-18 ENCOUNTER — ROUTINE PRENATAL (OUTPATIENT)
Dept: OBGYN | Facility: CLINIC | Age: 20
End: 2020-08-18
Payer: COMMERCIAL

## 2020-08-18 VITALS — DIASTOLIC BLOOD PRESSURE: 67 MMHG | WEIGHT: 222.4 LBS | BODY MASS INDEX: 35.9 KG/M2 | SYSTOLIC BLOOD PRESSURE: 129 MMHG

## 2020-08-18 DIAGNOSIS — Z3A.31 31 WEEKS GESTATION OF PREGNANCY: ICD-10-CM

## 2020-08-18 DIAGNOSIS — O09.893 SUPERVISION OF OTHER HIGH RISK PREGNANCIES, THIRD TRIMESTER: ICD-10-CM

## 2020-08-18 PROCEDURE — 90471 IMMUNIZATION ADMIN: CPT | Performed by: OBSTETRICS & GYNECOLOGY

## 2020-08-18 PROCEDURE — 90715 TDAP VACCINE 7 YRS/> IM: CPT | Performed by: OBSTETRICS & GYNECOLOGY

## 2020-08-18 PROCEDURE — 90040 PR PRENATAL FOLLOW UP: CPT | Performed by: OBSTETRICS & GYNECOLOGY

## 2020-08-18 NOTE — PROGRESS NOTES
Chief complaint return visit    S: Pt presents for routine OB follow up. Good fetal movement.  No contractions, vaginal bleeding, or leakage of fluid.    Questions answered.  Has not had 1 hour glucose testing done yet.  Has an appointment this Friday    O: /67   Wt 100.9 kg (222 lb 6.4 oz)   LMP 2020   BMI 35.90 kg/m²   Patients' weight gain, fluid intake and exercise level discussed.  Vitals, fundal height , fetal position, and FHR reviewed on flowsheet    Lab:No results found for this or any previous visit (from the past 336 hour(s)).    A/P:  20 y.o.  at 31w6d presents for routine obstetric follow-up.  Size equals dates and/or scan    1.  Continue prenatal vitamins.  2.  Fetal kick counts.  3.  Exercise at least 30 minutes daily.  4.  Drink at least 2L of water daily  5.  Labor precautions educated.  6.  Follow-up in 2 weeks.  7.  GBS at 36 weeks    Discussed with patient importance of glucose testing during pregnancy.  She will obtain testing as soon as possible    Tdap given today     all questions answered

## 2020-08-18 NOTE — PROGRESS NOTES
Pt. Here for OB/FU. Reports Good FM.   Good # [phone]  Pt states no complaints.   Pharmacy verified.   Tdap vaccine given today, right deltoid. Screening checklist reviewed with pt verified by Rachael CARRANZA   Pt states has appt for 1 HR GTT on 8/25/2020

## 2020-08-26 ENCOUNTER — HOSPITAL ENCOUNTER (OUTPATIENT)
Dept: LAB | Facility: MEDICAL CENTER | Age: 20
End: 2020-08-26
Attending: OBSTETRICS & GYNECOLOGY
Payer: COMMERCIAL

## 2020-08-26 DIAGNOSIS — Z3A.26 26 WEEKS GESTATION OF PREGNANCY: ICD-10-CM

## 2020-08-26 DIAGNOSIS — E74.39 GLUCOSE INTOLERANCE: ICD-10-CM

## 2020-08-26 LAB
ERYTHROCYTE [DISTWIDTH] IN BLOOD BY AUTOMATED COUNT: 44.4 FL (ref 35.9–50)
GLUCOSE 1H P 50 G GLC PO SERPL-MCNC: 170 MG/DL (ref 70–139)
HCT VFR BLD AUTO: 37.8 % (ref 37–47)
HGB BLD-MCNC: 12.6 G/DL (ref 12–16)
MCH RBC QN AUTO: 30.2 PG (ref 27–33)
MCHC RBC AUTO-ENTMCNC: 33.3 G/DL (ref 33.6–35)
MCV RBC AUTO: 90.6 FL (ref 81.4–97.8)
PLATELET # BLD AUTO: 201 K/UL (ref 164–446)
PMV BLD AUTO: 10.7 FL (ref 9–12.9)
RBC # BLD AUTO: 4.17 M/UL (ref 4.2–5.4)
TREPONEMA PALLIDUM IGG+IGM AB [PRESENCE] IN SERUM OR PLASMA BY IMMUNOASSAY: NORMAL
WBC # BLD AUTO: 8.3 K/UL (ref 4.8–10.8)

## 2020-08-26 PROCEDURE — 36415 COLL VENOUS BLD VENIPUNCTURE: CPT

## 2020-08-26 PROCEDURE — 86780 TREPONEMA PALLIDUM: CPT

## 2020-08-26 PROCEDURE — 85027 COMPLETE CBC AUTOMATED: CPT

## 2020-08-26 PROCEDURE — 82950 GLUCOSE TEST: CPT

## 2020-08-29 DIAGNOSIS — R73.02 IMPAIRED GLUCOSE TOLERANCE: ICD-10-CM

## 2020-08-31 DIAGNOSIS — E74.39 GLUCOSE INTOLERANCE: ICD-10-CM

## 2020-09-02 ENCOUNTER — ROUTINE PRENATAL (OUTPATIENT)
Dept: OBGYN | Facility: CLINIC | Age: 20
End: 2020-09-02
Payer: COMMERCIAL

## 2020-09-02 VITALS — BODY MASS INDEX: 36.15 KG/M2 | DIASTOLIC BLOOD PRESSURE: 68 MMHG | SYSTOLIC BLOOD PRESSURE: 119 MMHG | WEIGHT: 224 LBS

## 2020-09-02 DIAGNOSIS — Z34.83 ENCOUNTER FOR SUPERVISION OF OTHER NORMAL PREGNANCY, THIRD TRIMESTER: ICD-10-CM

## 2020-09-02 DIAGNOSIS — O99.810 ABNORMAL GLUCOSE AFFECTING PREGNANCY: ICD-10-CM

## 2020-09-02 PROBLEM — B96.89 BV (BACTERIAL VAGINOSIS): Status: RESOLVED | Noted: 2020-08-07 | Resolved: 2020-09-02

## 2020-09-02 PROBLEM — N76.0 BV (BACTERIAL VAGINOSIS): Status: RESOLVED | Noted: 2020-08-07 | Resolved: 2020-09-02

## 2020-09-02 PROBLEM — Z3A.31 31 WEEKS GESTATION OF PREGNANCY: Status: RESOLVED | Noted: 2020-06-02 | Resolved: 2020-09-02

## 2020-09-02 PROCEDURE — 90040 PR PRENATAL FOLLOW UP: CPT | Performed by: ADVANCED PRACTICE MIDWIFE

## 2020-09-02 NOTE — NON-PROVIDER
OB follow up   + fetal movement.  No VB, LOF or UC's.  Wt:224       BP: 119/68  Phone # [phone]  Preferred pharmacy confirmed.  3gtt will do before next appointment.

## 2020-09-02 NOTE — PROGRESS NOTES
Has patient been referred to outside provider?   no    Records available on chart?   n/a    SUBJECTIVE:  Pt is a 20 y.o.   at 34w0d  gestation. Presents today for follow-up prenatal care. Has not been seen in ER or L & D since last visit. Reports good  fetal movement.     Leaking of fluid?       no    Dysuria?       no    Headaches?      no    N/V?          no    Cramping/contractions?      no    Patient has not beein in able to obtain 3hr glucose test related to needing an appointment.     OBJECTIVE:   /68   Wt 101.6 kg (224 lb)   LMP 2020   BMI 36.15 kg/m²   Patients' weight gain, fluid intake and exercise level discussed.  Vitals, fundal height , fetal position, and FHR reviewed on flowsheet    Lab:  Recent Results (from the past 336 hour(s))   T.PALLIDUM AB EIA    Collection Time: 20  7:08 AM   Result Value Ref Range    Syphilis, Treponemal Qual Non-Reactive Non-Reactive   GLUCOSE 1HR GESTATIONAL    Collection Time: 20  7:08 AM   Result Value Ref Range    Glucose, Post Dose 170 (H) 70 - 139 mg/dL   CBC WITHOUT DIFFERENTIAL    Collection Time: 20  7:08 AM   Result Value Ref Range    WBC 8.3 4.8 - 10.8 K/uL    RBC 4.17 (L) 4.20 - 5.40 M/uL    Hemoglobin 12.6 12.0 - 16.0 g/dL    Hematocrit 37.8 37.0 - 47.0 %    MCV 90.6 81.4 - 97.8 fL    MCH 30.2 27.0 - 33.0 pg    MCHC 33.3 (L) 33.6 - 35.0 g/dL    RDW 44.4 35.9 - 50.0 fL    Platelet Count 201 164 - 446 K/uL    MPV 10.7 9.0 - 12.9 fL       ASSESSMENT/ PLAN:   - IUP at 34w0d    - S = D   -   Patient Active Problem List    Diagnosis Date Noted   • Supervision of other high risk pregnancies, third trimester 2020   • Vaginal bleeding in pregnancy, third trimester 2020         Briefly discussed birth control methods with patient. She will discuss with her partner when she desires to have another child. We briefly talked about progestin only methods as she desires to breastfeed.    Discussed Saturday labs with patient at  [address] location.    - S/sx pregnancy and labor warning signs vs general discomforts discussed  - Fetal movements and kick counts reviewed. Adequate hydration reinforced.  - Did discuss current COVID policies related to outpatient and inpatient visits.   - Anticipatory guidance provided.   - RTC in 2 weeks for routine prenatal care.

## 2020-09-05 ENCOUNTER — HOSPITAL ENCOUNTER (OUTPATIENT)
Dept: LAB | Facility: MEDICAL CENTER | Age: 20
End: 2020-09-05
Attending: OBSTETRICS & GYNECOLOGY
Payer: COMMERCIAL

## 2020-09-05 DIAGNOSIS — R73.02 IMPAIRED GLUCOSE TOLERANCE: ICD-10-CM

## 2020-09-05 LAB
GLUCOSE 1H P CHAL SERPL-MCNC: 173 MG/DL (ref 65–180)
GLUCOSE 2H P CHAL SERPL-MCNC: 131 MG/DL (ref 65–155)
GLUCOSE 3H P CHAL SERPL-MCNC: 131 MG/DL (ref 65–140)
GLUCOSE BS SERPL-MCNC: 78 MG/DL (ref 65–95)

## 2020-09-05 PROCEDURE — 36415 COLL VENOUS BLD VENIPUNCTURE: CPT

## 2020-09-05 PROCEDURE — 82951 GLUCOSE TOLERANCE TEST (GTT): CPT

## 2020-09-05 PROCEDURE — 82952 GTT-ADDED SAMPLES: CPT

## 2020-09-16 ENCOUNTER — HOSPITAL ENCOUNTER (OUTPATIENT)
Facility: MEDICAL CENTER | Age: 20
End: 2020-09-16
Attending: OBSTETRICS & GYNECOLOGY
Payer: COMMERCIAL

## 2020-09-16 ENCOUNTER — ROUTINE PRENATAL (OUTPATIENT)
Dept: OBGYN | Facility: CLINIC | Age: 20
End: 2020-09-16
Payer: COMMERCIAL

## 2020-09-16 VITALS
DIASTOLIC BLOOD PRESSURE: 65 MMHG | WEIGHT: 226 LBS | BODY MASS INDEX: 36.48 KG/M2 | SYSTOLIC BLOOD PRESSURE: 112 MMHG | HEART RATE: 66 BPM

## 2020-09-16 DIAGNOSIS — O09.893 SUPERVISION OF OTHER HIGH RISK PREGNANCIES, THIRD TRIMESTER: ICD-10-CM

## 2020-09-16 PROCEDURE — 87081 CULTURE SCREEN ONLY: CPT

## 2020-09-16 PROCEDURE — 87150 DNA/RNA AMPLIFIED PROBE: CPT

## 2020-09-16 PROCEDURE — 90040 PR PRENATAL FOLLOW UP: CPT | Performed by: OBSTETRICS & GYNECOLOGY

## 2020-09-16 NOTE — PROGRESS NOTES
Pt here today for OB follow up  Pt states no complaints  Reports +FM  Good # [phone]  Pharmacy Confirmed.  Chaperone offered and provided.   GBS today

## 2020-09-16 NOTE — PROGRESS NOTES
MERCEDES:  36w0d    Pt reports doing well.  Denies vaginal bleeding, contractions, LOF.  Reports +FM.    LMP 2020   gen: AAO, NAD  FHTs: 140  FH: 36    A/P: 20 y.o.  @ 36w0d  gume by LMP c/w 10 wk scan. O+/ ab neg, RI, HepB neg, T.pall neg, UA neg.   Quad screening neg.  --short interval pregnancy!  --failed 1hr, passed 3hr  Ultrasounds:   20 at 19w6d. EFW 320g, (32%tile), post placenta without previa, 3VC, normal insertion. COY normal, normal anatomy survey but lips and nose not seen. Repeat US  showed normal structures.  Immunizations: tdap   3rd tri labs: , , trep NR  GBS:  collected   Delivery Plan: await spon labor - discussed option of IOL today  Accepting of transfusion:[ ]  PPBCM: Paragard IUD

## 2020-09-17 LAB — GP B STREP DNA SPEC QL NAA+PROBE: NEGATIVE

## 2020-09-23 ENCOUNTER — ROUTINE PRENATAL (OUTPATIENT)
Dept: OBGYN | Facility: CLINIC | Age: 20
End: 2020-09-23
Payer: COMMERCIAL

## 2020-09-23 VITALS — WEIGHT: 229.2 LBS | SYSTOLIC BLOOD PRESSURE: 129 MMHG | BODY MASS INDEX: 36.99 KG/M2 | DIASTOLIC BLOOD PRESSURE: 66 MMHG

## 2020-09-23 DIAGNOSIS — O09.893 SUPERVISION OF OTHER HIGH RISK PREGNANCIES, THIRD TRIMESTER: ICD-10-CM

## 2020-09-23 PROCEDURE — 90040 PR PRENATAL FOLLOW UP: CPT | Performed by: OBSTETRICS & GYNECOLOGY

## 2020-09-23 PROCEDURE — 90686 IIV4 VACC NO PRSV 0.5 ML IM: CPT | Performed by: OBSTETRICS & GYNECOLOGY

## 2020-09-23 PROCEDURE — 90471 IMMUNIZATION ADMIN: CPT | Performed by: OBSTETRICS & GYNECOLOGY

## 2020-09-23 NOTE — PROGRESS NOTES
Pt is here for OB follow-up  No VB, UC or LOF.  Good phone #: [phone]  Pharmacy verified.  Pt states has having round ligament px when she moves for qd.  Pt states no other complaints for today.  GBS neg, Pt has been notified.  Pt desiresFlu vaccine today.    Flu vaccine given today. Right Deltoid. VIS given and screening check list reviewed with pt.  Flu vaccine verified by Rachael Sosa MA.

## 2020-09-23 NOTE — PROGRESS NOTES
S: Pt presents for routine OB follow up.  No contractions, vaginal bleeding, or leaking fluids. Good fetal movement.    Questions answered.    O: /66   Wt 104 kg (229 lb 3.2 oz)   LMP 2020   BMI 36.99 kg/m²   Patients' weight gain, fluid intake and exercise level discussed.  Vitals, fundal height , fetal position, and FHR reviewed on flowsheet    Lab:  Recent Results (from the past 336 hour(s))   GRP B STREP, BY PCR ([facility])    Collection Time: 20  2:47 PM    Specimen: Genital   Result Value Ref Range    Strep Gp B DNA PCR Negative Negative       A/P:  20 y.o.  at 37w0d presents for routine obstetric follow-up.  Size equals dates and/or scan  gume by LMP c/w 10 wk scan. O+/ ab neg, RI, HepB neg, T.pall neg, UA neg.   Quad screening neg.  --short interval pregnancy!  --failed 1hr, passed 3hr  Ultrasounds:   20 at 19w6d. EFW 320g, (32%tile), post placenta without previa, 3VC, normal insertion. COY normal, normal anatomy survey but lips and nose not seen. Repeat US  showed normal structures.  Immunizations: tdap   3rd tri labs: , , trep NR  GBS:  collected   Delivery Plan: await spon labor - discussed option of IOL today  Accepting of transfusion:[ ]  PPBCM: Paragard IUD

## 2020-09-30 ENCOUNTER — ROUTINE PRENATAL (OUTPATIENT)
Dept: OBGYN | Facility: CLINIC | Age: 20
End: 2020-09-30
Payer: COMMERCIAL

## 2020-09-30 VITALS — BODY MASS INDEX: 37.01 KG/M2 | DIASTOLIC BLOOD PRESSURE: 74 MMHG | SYSTOLIC BLOOD PRESSURE: 121 MMHG | WEIGHT: 229.3 LBS

## 2020-09-30 DIAGNOSIS — O09.893 SUPERVISION OF OTHER HIGH RISK PREGNANCIES, THIRD TRIMESTER: ICD-10-CM

## 2020-09-30 PROCEDURE — 90040 PR PRENATAL FOLLOW UP: CPT | Performed by: OBSTETRICS & GYNECOLOGY

## 2020-09-30 NOTE — PROGRESS NOTES
OB Followup;    38w0d    Patient Active Problem List    Diagnosis Date Noted   • Supervision of other high risk pregnancies, third trimester 2020   • Vaginal bleeding in pregnancy, third trimester 2020       Vitals:    20 1544   BP: 121/74   Weight: 104 kg (229 lb 4.8 oz)       Patient presents for followup of OB care. Currently doing well . Good fetal movement no leakage of fluid no contractions or vaginal bleeding        Size equals dates, normal fetal heart rate      Labs; BS negative    Labor precautions given  Increase oral hydration  Discussed proper weight gain during pregnancy  Continue prenatal vitamins  Signs and symptoms of labor/ labor discussed   Discussed our group's policy on prenatal checkups and delivery    Followup in  1 weeks

## 2020-09-30 NOTE — PROGRESS NOTES
Pt is here for OB follow-up  No VB, UC's or LOF.  Good phone #:[phone]  Pharmacy verified.  Pt states no other complaints for today.  Pt states would like to have a cervical check today.

## 2020-10-07 ENCOUNTER — ROUTINE PRENATAL (OUTPATIENT)
Dept: OBGYN | Facility: CLINIC | Age: 20
End: 2020-10-07
Payer: COMMERCIAL

## 2020-10-07 VITALS — DIASTOLIC BLOOD PRESSURE: 76 MMHG | SYSTOLIC BLOOD PRESSURE: 125 MMHG | BODY MASS INDEX: 37.32 KG/M2 | WEIGHT: 231.2 LBS

## 2020-10-07 DIAGNOSIS — O09.893 SUPERVISION OF OTHER HIGH RISK PREGNANCIES, THIRD TRIMESTER: ICD-10-CM

## 2020-10-07 PROCEDURE — 90040 PR PRENATAL FOLLOW UP: CPT | Performed by: OBSTETRICS & GYNECOLOGY

## 2020-10-07 NOTE — PROGRESS NOTES
S: Pt presents for routine OB follow up.  No contractions, vaginal bleeding, or leaking fluids. Good fetal movement.    Questions answered.    O: /76   Wt 104.9 kg (231 lb 3.2 oz)   LMP 2020   BMI 37.32 kg/m²   Patients' weight gain, fluid intake and exercise level discussed.  Vitals, fundal height , fetal position, and FHR reviewed on flowsheet    Lab:No results found for this or any previous visit (from the past 336 hour(s)).    A/P:  20 y.o.  at 39w0d presents for routine obstetric follow-up.  Size equals dates and/or scan    gume by LMP c/w 10 wk scan. O+/ ab neg, RI, HepB neg, T.pall neg, UA neg.   Quad screening neg.  --short interval pregnancy!  --failed 1hr, passed 3hr  Ultrasounds:   20 at 19w6d. EFW 320g, (32%tile), post placenta without previa, 3VC, normal insertion. COY normal, normal anatomy survey but lips and nose not seen. Repeat US  showed normal structures.  Immunizations: tdap   3rd tri labs: , , trep NR  GBS:  collected   Delivery Plan: await spon labor - discussed option of IOL   Accepting of transfusion:[ ]  PPBCM: Mirena IUD    IOL ordered

## 2020-10-07 NOTE — PROGRESS NOTES
Pt here today for OB follow up  Pt states no complaints.  Reports +FM  Good # [phone]  Pharmacy Confirmed.  Chaperone offered and provided.

## 2020-10-11 ENCOUNTER — ANESTHESIA EVENT (OUTPATIENT)
Dept: ANESTHESIOLOGY | Facility: MEDICAL CENTER | Age: 20
End: 2020-10-11
Payer: COMMERCIAL

## 2020-10-11 ENCOUNTER — HOSPITAL ENCOUNTER (INPATIENT)
Facility: MEDICAL CENTER | Age: 20
LOS: 2 days | End: 2020-10-13
Attending: OBSTETRICS & GYNECOLOGY | Admitting: OBSTETRICS & GYNECOLOGY
Payer: COMMERCIAL

## 2020-10-11 ENCOUNTER — ANESTHESIA (OUTPATIENT)
Dept: ANESTHESIOLOGY | Facility: MEDICAL CENTER | Age: 20
End: 2020-10-11
Payer: COMMERCIAL

## 2020-10-11 ENCOUNTER — APPOINTMENT (OUTPATIENT)
Dept: OBGYN | Facility: MEDICAL CENTER | Age: 20
End: 2020-10-11
Attending: OBSTETRICS & GYNECOLOGY
Payer: COMMERCIAL

## 2020-10-11 PROBLEM — E66.9 OBESITY: Chronic | Status: ACTIVE | Noted: 2020-10-11

## 2020-10-11 LAB
BASOPHILS # BLD AUTO: 0.5 % (ref 0–1.8)
BASOPHILS # BLD: 0.04 K/UL (ref 0–0.12)
COVID ORDER STATUS COVID19: NORMAL
EOSINOPHIL # BLD AUTO: 0.07 K/UL (ref 0–0.51)
EOSINOPHIL NFR BLD: 0.8 % (ref 0–6.9)
ERYTHROCYTE [DISTWIDTH] IN BLOOD BY AUTOMATED COUNT: 44.8 FL (ref 35.9–50)
HCT VFR BLD AUTO: 37.4 % (ref 37–47)
HGB BLD-MCNC: 12.7 G/DL (ref 12–16)
HOLDING TUBE BB 8507: NORMAL
IMM GRANULOCYTES # BLD AUTO: 0.02 K/UL (ref 0–0.11)
IMM GRANULOCYTES NFR BLD AUTO: 0.2 % (ref 0–0.9)
LYMPHOCYTES # BLD AUTO: 2.26 K/UL (ref 1–4.8)
LYMPHOCYTES NFR BLD: 27.1 % (ref 22–41)
MCH RBC QN AUTO: 30.4 PG (ref 27–33)
MCHC RBC AUTO-ENTMCNC: 34 G/DL (ref 33.6–35)
MCV RBC AUTO: 89.5 FL (ref 81.4–97.8)
MONOCYTES # BLD AUTO: 0.57 K/UL (ref 0–0.85)
MONOCYTES NFR BLD AUTO: 6.8 % (ref 0–13.4)
NEUTROPHILS # BLD AUTO: 5.39 K/UL (ref 2–7.15)
NEUTROPHILS NFR BLD: 64.6 % (ref 44–72)
NRBC # BLD AUTO: 0 K/UL
NRBC BLD-RTO: 0 /100 WBC
PLATELET # BLD AUTO: 198 K/UL (ref 164–446)
PMV BLD AUTO: 10.4 FL (ref 9–12.9)
RBC # BLD AUTO: 4.18 M/UL (ref 4.2–5.4)
SARS-COV+SARS-COV-2 AG RESP QL IA.RAPID: NOTDETECTED
SPECIMEN SOURCE: NORMAL
WBC # BLD AUTO: 8.4 K/UL (ref 4.8–10.8)

## 2020-10-11 PROCEDURE — 700111 HCHG RX REV CODE 636 W/ 250 OVERRIDE (IP): Performed by: ANESTHESIOLOGY

## 2020-10-11 PROCEDURE — 700105 HCHG RX REV CODE 258

## 2020-10-11 PROCEDURE — 770002 HCHG ROOM/CARE - OB PRIVATE (112)

## 2020-10-11 PROCEDURE — 59409 OBSTETRICAL CARE: CPT

## 2020-10-11 PROCEDURE — 85025 COMPLETE CBC W/AUTO DIFF WBC: CPT

## 2020-10-11 PROCEDURE — 700101 HCHG RX REV CODE 250: Performed by: ANESTHESIOLOGY

## 2020-10-11 PROCEDURE — C9803 HOPD COVID-19 SPEC COLLECT: HCPCS | Performed by: OBSTETRICS & GYNECOLOGY

## 2020-10-11 PROCEDURE — 3E033VJ INTRODUCTION OF OTHER HORMONE INTO PERIPHERAL VEIN, PERCUTANEOUS APPROACH: ICD-10-PCS | Performed by: OBSTETRICS & GYNECOLOGY

## 2020-10-11 PROCEDURE — 59400 OBSTETRICAL CARE: CPT | Performed by: NURSE PRACTITIONER

## 2020-10-11 PROCEDURE — 36415 COLL VENOUS BLD VENIPUNCTURE: CPT

## 2020-10-11 PROCEDURE — 700111 HCHG RX REV CODE 636 W/ 250 OVERRIDE (IP)

## 2020-10-11 PROCEDURE — 700111 HCHG RX REV CODE 636 W/ 250 OVERRIDE (IP): Performed by: STUDENT IN AN ORGANIZED HEALTH CARE EDUCATION/TRAINING PROGRAM

## 2020-10-11 PROCEDURE — 87426 SARSCOV CORONAVIRUS AG IA: CPT

## 2020-10-11 PROCEDURE — 303615 HCHG EPIDURAL/SPINAL ANESTHESIA FOR LABOR

## 2020-10-11 PROCEDURE — 700105 HCHG RX REV CODE 258: Performed by: STUDENT IN AN ORGANIZED HEALTH CARE EDUCATION/TRAINING PROGRAM

## 2020-10-11 PROCEDURE — 304965 HCHG RECOVERY SERVICES

## 2020-10-11 RX ORDER — ROPIVACAINE HYDROCHLORIDE 2 MG/ML
INJECTION, SOLUTION EPIDURAL; INFILTRATION; PERINEURAL
Status: COMPLETED
Start: 2020-10-11 | End: 2020-10-11

## 2020-10-11 RX ORDER — SODIUM CHLORIDE, SODIUM LACTATE, POTASSIUM CHLORIDE, CALCIUM CHLORIDE 600; 310; 30; 20 MG/100ML; MG/100ML; MG/100ML; MG/100ML
INJECTION, SOLUTION INTRAVENOUS
Status: COMPLETED
Start: 2020-10-11 | End: 2020-10-12

## 2020-10-11 RX ORDER — IBUPROFEN 600 MG/1
600 TABLET ORAL EVERY 6 HOURS PRN
Status: DISCONTINUED | OUTPATIENT
Start: 2020-10-11 | End: 2020-10-13 | Stop reason: HOSPADM

## 2020-10-11 RX ORDER — ACETAMINOPHEN 325 MG/1
325 TABLET ORAL EVERY 4 HOURS PRN
Status: DISCONTINUED | OUTPATIENT
Start: 2020-10-11 | End: 2020-10-13 | Stop reason: HOSPADM

## 2020-10-11 RX ORDER — LIDOCAINE HYDROCHLORIDE AND EPINEPHRINE 15; 5 MG/ML; UG/ML
INJECTION, SOLUTION EPIDURAL
Status: COMPLETED | OUTPATIENT
Start: 2020-10-11 | End: 2020-10-11

## 2020-10-11 RX ORDER — SODIUM CHLORIDE, SODIUM LACTATE, POTASSIUM CHLORIDE, CALCIUM CHLORIDE 600; 310; 30; 20 MG/100ML; MG/100ML; MG/100ML; MG/100ML
INJECTION, SOLUTION INTRAVENOUS CONTINUOUS
Status: DISCONTINUED | OUTPATIENT
Start: 2020-10-11 | End: 2020-10-12

## 2020-10-11 RX ORDER — BUPIVACAINE HYDROCHLORIDE 2.5 MG/ML
INJECTION, SOLUTION EPIDURAL; INFILTRATION; INTRACAUDAL
Status: COMPLETED | OUTPATIENT
Start: 2020-10-11 | End: 2020-10-11

## 2020-10-11 RX ORDER — BUPIVACAINE HYDROCHLORIDE 2.5 MG/ML
INJECTION, SOLUTION EPIDURAL; INFILTRATION; INTRACAUDAL
Status: COMPLETED
Start: 2020-10-11 | End: 2020-10-11

## 2020-10-11 RX ADMIN — ROPIVACAINE HYDROCHLORIDE 200 MG: 2 INJECTION, SOLUTION EPIDURAL; INFILTRATION at 17:32

## 2020-10-11 RX ADMIN — OXYTOCIN 125 ML/HR: 10 INJECTION, SOLUTION INTRAMUSCULAR; INTRAVENOUS at 21:44

## 2020-10-11 RX ADMIN — BUPIVACAINE HYDROCHLORIDE 6 ML: 2.5 INJECTION, SOLUTION EPIDURAL; INFILTRATION; INTRACAUDAL; PERINEURAL at 17:20

## 2020-10-11 RX ADMIN — OXYTOCIN 2 MILLI-UNITS/MIN: 10 INJECTION, SOLUTION INTRAMUSCULAR; INTRAVENOUS at 16:39

## 2020-10-11 RX ADMIN — OXYTOCIN 2000 ML/HR: 10 INJECTION, SOLUTION INTRAMUSCULAR; INTRAVENOUS at 20:40

## 2020-10-11 RX ADMIN — FENTANYL CITRATE 100 MCG: 50 INJECTION, SOLUTION INTRAMUSCULAR; INTRAVENOUS at 17:20

## 2020-10-11 RX ADMIN — SODIUM CHLORIDE, POTASSIUM CHLORIDE, SODIUM LACTATE AND CALCIUM CHLORIDE: 600; 310; 30; 20 INJECTION, SOLUTION INTRAVENOUS at 17:38

## 2020-10-11 RX ADMIN — LIDOCAINE HYDROCHLORIDE,EPINEPHRINE BITARTRATE 3 ML: 15; .005 INJECTION, SOLUTION EPIDURAL; INFILTRATION; INTRACAUDAL; PERINEURAL at 17:20

## 2020-10-11 RX ADMIN — SODIUM CHLORIDE, POTASSIUM CHLORIDE, SODIUM LACTATE AND CALCIUM CHLORIDE 1000 ML: 600; 310; 30; 20 INJECTION, SOLUTION INTRAVENOUS at 16:30

## 2020-10-11 ASSESSMENT — LIFESTYLE VARIABLES
EVER_SMOKED: NEVER
TOTAL SCORE: 0
HAVE PEOPLE ANNOYED YOU BY CRITICIZING YOUR DRINKING: NO
ALCOHOL_USE: NO
DOES PATIENT WANT TO STOP DRINKING: NO
EVER HAD A DRINK FIRST THING IN THE MORNING TO STEADY YOUR NERVES TO GET RID OF A HANGOVER: NO
HAVE YOU EVER FELT YOU SHOULD CUT DOWN ON YOUR DRINKING: NO
TOTAL SCORE: 0
CONSUMPTION TOTAL: INCOMPLETE
TOTAL SCORE: 0
EVER FELT BAD OR GUILTY ABOUT YOUR DRINKING: NO

## 2020-10-11 NOTE — H&P
History and Physical    Jodee Marin is a 20 y.o. female  -Para:     Gestational Age:  39w4d  Admitted for:   Induction of Labor  Admitted to  [facility] Delivery.  Patient received prenatal care: [facility]    HPI: Patient is admitted with the above mentioned Chief Complaint and States   Loss of fluid:   negative  Abdominal Pain:  negative  Uterine Contractions:  positive  Vaginal Bleeding:  negative  Fetal Movement:  normal  Patient denies fever, chills, nausea, vomiting , headache, visual disturbance, or dysuria  Patient's last menstrual period was 2020.  Estimated Date of Delivery: 10/14/20  Final DAKOTAH: 10/14/2020, by Last Menstrual Period    Patient Active Problem List    Diagnosis Date Noted   • Supervision of other high risk pregnancies, third trimester 2020   • Vaginal bleeding in pregnancy, third trimester 2020       Admitting DX: Pregnancy   Pregnancy Complications:  none  OB Risk Factors:   None  Labor State:    Active Labor  History:   has no past medical history of Addisons disease ([facility]), Adrenal disorder ([facility]), Allergy, Allergy, unspecified not elsewhere classified, Anemia, Anxiety, Arrhythmia, Arthritis, Asthma, Blood transfusion without reported diagnosis, CAD (coronary artery disease), Cancer ([facility]), Cataract, CHF (congestive heart failure) ([facility]), Chronic obstructive pulmonary disease ([facility]), Clotting disorder ([facility]), Congestive heart failure ([facility]), COPD (chronic obstructive pulmonary disease) ([facility]), Cushings syndrome ([facility]), Depression, Diabetes ([facility]), Diabetic neuropathy ([facility]), GERD (gastroesophageal reflux disease), Glaucoma, Goiter, Head ache, Heart attack ([facility]), Heart murmur, HIV (human immunodeficiency virus infection) ([facility]), Hyperlipidemia, Hypertension, IBD (inflammatory bowel disease), Kidney disease, Meningitis, Migraine, Muscle disorder, Osteoporosis, Parathyroid disorder ([facility]), Pituitary disease ([facility]), Pulmonary emphysema ([facility]), Seizure  "(HCC), Seizure disorder (HCC), Sickle cell disease (HCC), Stroke (HCC), Substance abuse (HCC), Thyroid disease, Tuberculosis, Type II or unspecified type diabetes mellitus without mention of complication, not stated as uncontrolled, or Urinary tract infection.     has no past surgical history on file.    OB History    Para Term  AB Living   2 1 1     1   SAB TAB Ectopic Molar Multiple Live Births           0 1      # Outcome Date GA Lbr [place]o/2nd Weight Sex Delivery Anes PTL Lv   2 Current            1 Term 19 39w2d  3.595 kg (7 lb 14.8 oz) M Vag-Spont EPI N NÉSTOR       Medications:  No current facility-administered medications on file prior to encounter.      Current Outpatient Medications on File Prior to Encounter   Medication Sig Dispense Refill   • Prenatal MV-Min-Fe Fum-FA-DHA (PRENATAL 1 PO) Take 1 tablet by mouth every day.         Allergies:  Patient has no known allergies.    ROS:   Neuro: negative    Cardiovascular: negative  Gastro intestinal: negative  Genitourinary: negative            Physical Exam:  Ht 1.676 m (5' 5.98\")   Wt 104.9 kg (231 lb 3.2 oz)   LMP 2020   BMI 37.33 kg/m²   Constitutional: healthy-appearing  Cardio: RRR. No murmer.   Lung: CTAB  Abdomen: abdomen is soft without significant tenderness, masses, organomegaly or guarding  Extremity: No LE edema or redness    SVE: 6/80/-3  FHT: Moderate variability, Baseline 140, + accels, - deccels   [place]: Contractions present      Labs:      Prenatal Results     General (Most Recent Result)     Test Value Date Time    ABO O  20 1706    Rh POS  20 1706    Antibody screen NEG  20 1706    HbA1c       Gonorrhea Negative  20 1745    Chlamydia  by PCR Negative  20 1745    Chlamydia by DNA       RPR/Syphilus Non-Reactive  20 0708    HSV 1/2 by PCR (non-serum)       HSV 1/2 (serum)       HSV 1       HSV 2       HPV (16)       HPV (18)       HPV (other)       HBsAg Non-Reactive  20 1702    " HIV-1 HIV-2 Antibodies Non-Reactive  05/12/20 1702    Rubella >500 IU/mL 05/12/20 1702    Tb             Pap Smear (Most Recent Result)     Test Value Date Time    Pap smear       Pap smear w/HPV       Pap smear w/CTNG       Pap smar w/HPV CTNG       Pap smear (reflex HPV ACUS)       Pap smear (reflex HPV ASCUS w/CTNG)       Pathology gyn specimen             Urinalysis (Most Recent Result)     Test Value Date Time    Urinalysis       Urinalysis (culture if indicated)  (See Report)   07/03/19 2100    POC urinalysis  (See Report)   12/26/19 1936    Urine drug screen       Urine drug screen (w/o conf)       Urine culture ([id number])       Urine culture ([id number])  (See Report)   05/12/20 1702          1st Trimester     Test Value Date Time    Hgb       Hct       Fasting Glucose Tolerance       GTT, 1 hour       GTT, 2 hours       GTT, 3 hours             2nd Trimester     Test Value Date Time    Hgb 13.0 g/dL 05/12/20 1702    Hct 38.1 % 05/12/20 1702    Urinalysis       Urine Culture       AST       ALT       Uric Acid       Fasting Glucose Tolerance       GTT, 1 hour       GTT, 2 hours       GTT, 3 hours       Urine Protein/Creatinine Ratio             3rd Trimester     Test Value Date Time    Hgb 12.6 g/dL 08/26/20 0708      12.3 g/dL 07/30/20 1609    Hct 37.8 % 08/26/20 0708      37.1 % 07/30/20 1609    Platelet count 201 K/uL 08/26/20 0708      203 K/uL 07/30/20 1609    GBS ([facility] BROTH) Negative  09/16/20 1447    GBS (Direct) Negative  09/16/20 1447    Urinalysis       Urine Culture       Urine Drug Screen       Urine Protein/Creatinine Ratio             Congenital Disease Screening     Test Value Date Time    First Trimester Screen       Quad Screen       Sickle Cell       Thalassemia       [facility]       Cystic Fibrosis Carrier Study                     Assessment:  Gestational Age:  39w4d  Labor State:   Labor, Active  Risk Factors:   None  Pregnancy Complications: None     Patient Active Problem List     Diagnosis Date Noted   • Supervision of other high risk pregnancies, third trimester 2020   • Vaginal bleeding in pregnancy, third trimester 2020       Plan:   20-year-old -0-0-1 at 39 weeks 4 days gestational age here for elective induction of labor.  -FHT category 1 overall  -Induction with Pitocin  -Anticipate   -GBS negative     Chuy Boles D.O.

## 2020-10-12 LAB
ERYTHROCYTE [DISTWIDTH] IN BLOOD BY AUTOMATED COUNT: 44.4 FL (ref 35.9–50)
HCT VFR BLD AUTO: 35.1 % (ref 37–47)
HGB BLD-MCNC: 11.8 G/DL (ref 12–16)
MCH RBC QN AUTO: 29.9 PG (ref 27–33)
MCHC RBC AUTO-ENTMCNC: 33.6 G/DL (ref 33.6–35)
MCV RBC AUTO: 88.9 FL (ref 81.4–97.8)
PLATELET # BLD AUTO: 158 K/UL (ref 164–446)
PMV BLD AUTO: 10.3 FL (ref 9–12.9)
RBC # BLD AUTO: 3.95 M/UL (ref 4.2–5.4)
WBC # BLD AUTO: 10.9 K/UL (ref 4.8–10.8)

## 2020-10-12 PROCEDURE — 700102 HCHG RX REV CODE 250 W/ 637 OVERRIDE(OP): Performed by: NURSE PRACTITIONER

## 2020-10-12 PROCEDURE — 36415 COLL VENOUS BLD VENIPUNCTURE: CPT

## 2020-10-12 PROCEDURE — 700102 HCHG RX REV CODE 250 W/ 637 OVERRIDE(OP): Performed by: STUDENT IN AN ORGANIZED HEALTH CARE EDUCATION/TRAINING PROGRAM

## 2020-10-12 PROCEDURE — A9270 NON-COVERED ITEM OR SERVICE: HCPCS | Performed by: STUDENT IN AN ORGANIZED HEALTH CARE EDUCATION/TRAINING PROGRAM

## 2020-10-12 PROCEDURE — A9270 NON-COVERED ITEM OR SERVICE: HCPCS | Performed by: NURSE PRACTITIONER

## 2020-10-12 PROCEDURE — 85027 COMPLETE CBC AUTOMATED: CPT

## 2020-10-12 PROCEDURE — 770002 HCHG ROOM/CARE - OB PRIVATE (112)

## 2020-10-12 RX ORDER — MISOPROSTOL 200 UG/1
600 TABLET ORAL
Status: DISCONTINUED | OUTPATIENT
Start: 2020-10-12 | End: 2020-10-13 | Stop reason: HOSPADM

## 2020-10-12 RX ORDER — SODIUM CHLORIDE, SODIUM LACTATE, POTASSIUM CHLORIDE, AND CALCIUM CHLORIDE .6; .31; .03; .02 G/100ML; G/100ML; G/100ML; G/100ML
1000 INJECTION, SOLUTION INTRAVENOUS
Status: DISCONTINUED | OUTPATIENT
Start: 2020-10-12 | End: 2020-10-12

## 2020-10-12 RX ORDER — SODIUM CHLORIDE, SODIUM LACTATE, POTASSIUM CHLORIDE, AND CALCIUM CHLORIDE .6; .31; .03; .02 G/100ML; G/100ML; G/100ML; G/100ML
250 INJECTION, SOLUTION INTRAVENOUS PRN
Status: DISCONTINUED | OUTPATIENT
Start: 2020-10-12 | End: 2020-10-12

## 2020-10-12 RX ORDER — METHYLERGONOVINE MALEATE 0.2 MG/ML
0.2 INJECTION INTRAVENOUS
Status: DISCONTINUED | OUTPATIENT
Start: 2020-10-12 | End: 2020-10-13 | Stop reason: HOSPADM

## 2020-10-12 RX ORDER — VITAMIN A ACETATE, BETA CAROTENE, ASCORBIC ACID, CHOLECALCIFEROL, .ALPHA.-TOCOPHEROL ACETATE, DL-, THIAMINE MONONITRATE, RIBOFLAVIN, NIACINAMIDE, PYRIDOXINE HYDROCHLORIDE, FOLIC ACID, CYANOCOBALAMIN, CALCIUM CARBONATE, FERROUS FUMARATE, ZINC OXIDE, CUPRIC OXIDE 3080; 12; 120; 400; 1; 1.84; 3; 20; 22; 920; 25; 200; 27; 10; 2 [IU]/1; UG/1; MG/1; [IU]/1; MG/1; MG/1; MG/1; MG/1; MG/1; [IU]/1; MG/1; MG/1; MG/1; MG/1; MG/1
1 TABLET, FILM COATED ORAL
Status: DISCONTINUED | OUTPATIENT
Start: 2020-10-12 | End: 2020-10-13 | Stop reason: HOSPADM

## 2020-10-12 RX ORDER — CARBOPROST TROMETHAMINE 250 UG/ML
250 INJECTION, SOLUTION INTRAMUSCULAR
Status: DISCONTINUED | OUTPATIENT
Start: 2020-10-12 | End: 2020-10-13 | Stop reason: HOSPADM

## 2020-10-12 RX ORDER — SODIUM CHLORIDE, SODIUM LACTATE, POTASSIUM CHLORIDE, CALCIUM CHLORIDE 600; 310; 30; 20 MG/100ML; MG/100ML; MG/100ML; MG/100ML
INJECTION, SOLUTION INTRAVENOUS PRN
Status: DISCONTINUED | OUTPATIENT
Start: 2020-10-12 | End: 2020-10-13 | Stop reason: HOSPADM

## 2020-10-12 RX ORDER — ROPIVACAINE HYDROCHLORIDE 2 MG/ML
INJECTION, SOLUTION EPIDURAL; INFILTRATION; PERINEURAL CONTINUOUS
Status: DISCONTINUED | OUTPATIENT
Start: 2020-10-12 | End: 2020-10-12

## 2020-10-12 RX ORDER — DOCUSATE SODIUM 100 MG/1
100 CAPSULE, LIQUID FILLED ORAL 2 TIMES DAILY PRN
Status: DISCONTINUED | OUTPATIENT
Start: 2020-10-12 | End: 2020-10-13 | Stop reason: HOSPADM

## 2020-10-12 RX ADMIN — IBUPROFEN 600 MG: 600 TABLET, FILM COATED ORAL at 01:39

## 2020-10-12 RX ADMIN — PRENATAL WITH FERROUS FUM AND FOLIC ACID 1 TABLET: 3080; 920; 120; 400; 22; 1.84; 3; 20; 10; 1; 12; 200; 27; 25; 2 TABLET ORAL at 09:52

## 2020-10-12 RX ADMIN — IBUPROFEN 600 MG: 600 TABLET, FILM COATED ORAL at 14:12

## 2020-10-12 ASSESSMENT — PATIENT HEALTH QUESTIONNAIRE - PHQ9
1. LITTLE INTEREST OR PLEASURE IN DOING THINGS: NOT AT ALL
2. FEELING DOWN, DEPRESSED, IRRITABLE, OR HOPELESS: NOT AT ALL
SUM OF ALL RESPONSES TO PHQ9 QUESTIONS 1 AND 2: 0

## 2020-10-12 ASSESSMENT — EDINBURGH POSTNATAL DEPRESSION SCALE (EPDS)
I HAVE BEEN SO UNHAPPY THAT I HAVE HAD DIFFICULTY SLEEPING: NOT AT ALL
I HAVE BEEN ABLE TO LAUGH AND SEE THE FUNNY SIDE OF THINGS: AS MUCH AS I ALWAYS COULD
I HAVE LOOKED FORWARD WITH ENJOYMENT TO THINGS: AS MUCH AS I EVER DID
I HAVE BEEN SO UNHAPPY THAT I HAVE BEEN CRYING: NO, NEVER
I HAVE FELT SCARED OR PANICKY FOR NO GOOD REASON: NO, NOT AT ALL
I HAVE BLAMED MYSELF UNNECESSARILY WHEN THINGS WENT WRONG: NO, NEVER
I HAVE FELT SAD OR MISERABLE: NO, NOT AT ALL
I HAVE BEEN ANXIOUS OR WORRIED FOR NO GOOD REASON: NO, NOT AT ALL
THINGS HAVE BEEN GETTING ON TOP OF ME: NO, I HAVE BEEN COPING AS WELL AS EVER
THE THOUGHT OF HARMING MYSELF HAS OCCURRED TO ME: NEVER

## 2020-10-12 NOTE — ANESTHESIA POSTPROCEDURE EVALUATION
Patient: Jodee Marin    Procedure Summary     Date: 10/11/20 Room / Location:     Anesthesia Start: 1715 Anesthesia Stop: 2037    Procedure: Labor Epidural Diagnosis:     Scheduled Providers:  Responsible Provider: Eileen Parrish M.D.    Anesthesia Type: epidural ASA Status: 2          Final Anesthesia Type: epidural  Last vitals  BP   Blood Pressure: 139/78    Temp   (!) 35.6 °C (96 °F)    Pulse   Pulse: 67   Resp     16   SpO2   95 %      Anesthesia Post Evaluation    Patient location during evaluation: PACU  Patient participation: complete - patient participated  Level of consciousness: awake and alert    Airway patency: patent  Anesthetic complications: no  Cardiovascular status: hemodynamically stable  Respiratory status: acceptable  Hydration status: euvolemic    PONV: none           Nurse Pain Score: 4 (NPRS)

## 2020-10-12 NOTE — PROGRESS NOTES
Bedside report received from A Reji RN, POC discussed, assumed care. Pt denies needs at this time      of a viable male, 8/9 APGARs     Placenta spontaneously delivered intact

## 2020-10-12 NOTE — CARE PLAN
Problem: Communication  Goal: The ability to communicate needs accurately and effectively will improve  Outcome: PROGRESSING AS EXPECTED  Intervention: [place] patient and significant other/support system to call light to alert staff of needs  Flowsheets (Taken 10/12/2020 0246)  Oriented to::   All of the Following : Location of Bathroom, Visiting Policy, Unit Routine, Call Light and Bedside Controls, Bedside Rail Policy, Smoking Policy, Rights and Responsibilities, Bedside Report, and Patient Education Notebook   Unit Routine   Bedside Report   Location of bathroom   Call Light & Bedside Controls   Patient Rights and Responsibilities   Visiting Policy   Bedside Rail Policy  Note: Pt and SO oriented to room, unit, plan of care, call light, infant safety and security, questions answered     Problem: Altered physiologic condition related to immediate post-delivery state and potential for bleeding/hemorrhage  Goal: Patient physiologically stable as evidenced by normal lochia, palpable uterine involution and vital signs within normal limits  Outcome: PROGRESSING AS EXPECTED  Intervention: Perform physical assessment and obtain vital signs on patient as directed in Intrapartum/Postpartum Standard of Care in Policy and Procedure manual  Note: Fundus firm, lochia light without clots expressed, pt educated on david care and pad changes, instructed to call for excessive bleeding or large clots

## 2020-10-12 NOTE — PROGRESS NOTES
1600-pt presents from home for scheduled IOL, no c/o leaking, bleeding, pain or uc's, states baby is moving normally, placed on external monitors, vs taken, SVE 6/80/-3, report given to Dr Boles, will discuss with Dr Angulo  1630-iv started, labs drawn and sent  1640-pitocin started per orders, pt and FOB educated on pitocin, verbalized understanding  1720-epidural placed by Dr Parrish, pt tolerated well  1740-report given to A Pimpl RN, POC discussed

## 2020-10-12 NOTE — PROGRESS NOTES
Pt received to room 351 from L&D via wheelchair, transferred to bed without difficulty. Fundus firm, lochia light, pt voided and pad changed prior to transfer to floor. LR with pitocin infusing at 125ml/hr. Bedside report received from Sonia VERDUZCO. Pt and SO oriented to room, unit, plan of care, call light, pain management options, bed controls, and emergency call system. Injoy melina and pt education sheet given to pt to review for resources. Questions answered, lactation assistance offered, continuing to monitor. Adan Vasquez R.N.

## 2020-10-12 NOTE — PROGRESS NOTES
"1740 - Received report from SLADE Murphy RN. Pt just received epidural and unable to feel contractions, just feeling \"pressure.\"  1745 - Admit profile completed.   1845 - Report given to Maryjane CARRANZA RN, All questions answered.   "

## 2020-10-12 NOTE — PROGRESS NOTES
Post Partum Progress Note    Name:   Jodee Marin   Date/Time:  10/12/2020 - 6:03 AM  Chief Admitting Dx:  Pregnancy  Pregnancy  Delivery Type:  vaginal, spontaneous  Post-Op/Post Partum Days #:  1    Subjective:  Abdominal pain: no  Ambulating:   yes  Tolerating liquids:  yes  Tolerating food:  yes common adult  Flatus:   no  BM:    no  Bleeding:   without any bleeding  Voiding:   yes  Dizziness:   no  Feeding:   breast    Vitals:    10/11/20 2031 10/11/20 2101 10/11/20 2144 10/12/20 0047   BP: 139/78 120/67 112/54 117/61   Pulse: 67 66 63 73   Resp:    18   Temp:    36.9 °C (98.4 °F)   TempSrc:    Temporal   SpO2:       Weight:       Height:           Exam:  Breast: Tenderness no  Abdomen: Abdomen soft, non-tender. BS normal. No masses,  No organomegaly  Fundal Tenderness:  no  Fundus Firm: yes  Incision: none  Below umbilicus: no  Perineum: perineum intact  Lochia: mild  Extremities: Normal extremities, peripheral pulses and reflexes normal    Meds:  Current Facility-Administered Medications   Medication Dose   • LR infusion     • measles, mumps and rubella vaccine (MMR) injection 0.5 mL  0.5 mL   • PRN oxytocin (PITOCIN) (20 Units/1000 mL) PRN for excessive uterine bleeding - See Admin Instr  125-999 mL/hr   • miSOPROStol (CYTOTEC) tablet 600 mcg  600 mcg   • methylergonovine (METHERGINE) injection 0.2 mg  0.2 mg   • carboPROST (HEMABATE) injection 250 mcg  250 mcg   • docusate sodium (COLACE) capsule 100 mg  100 mg   • prenatal plus vitamin (STUARTNATAL 1+1) 27-1 MG tablet 1 Tab  1 Tab   • oxytocin (PITOCIN) infusion (for postpartum)   mL/hr   • ibuprofen (MOTRIN) tablet 600 mg  600 mg   • acetaminophen (TYLENOL) tablet 325 mg  325 mg       Labs:   Recent Labs     10/11/20  1630   WBC 8.4   RBC 4.18*   HEMOGLOBIN 12.7   HEMATOCRIT 37.4   MCV 89.5   MCH 30.4   MCHC 34.0   RDW 44.8   PLATELETCT 198   MPV 10.4       Assessment:  Chief Admitting Dx:  Pregnancy  Pregnancy  Delivery Type:  vaginal,  spontaneous  Tubal Ligation:  no    Plan:  Continue routine post partum care.  Anticipate discharge on PPD#2    KEYONNA Alston.

## 2020-10-12 NOTE — ANESTHESIA QCDR
2019 [facility] Clinical Data Registry (for Quality Improvement)     Postoperative nausea/vomiting risk protocol (Adult = 18 yrs and Pediatric 3-17 yrs)- (430 and 463)  General inhalation anesthetic (NOT TIVA) with PONV risk factors: No  Provision of anti-emetic therapy with at least 2 different classes of agents: N/A  Patient DID NOT receive anti-emetic therapy and reason is documented in Medical Record: N/A    Multimodal Pain Management- (477)  Non-emergent surgery AND patient age >= 18: No  Use of Multimodal Pain Management, two or more drugs and/or interventions, NOT including systemic opioids:   Exception: Documented allergy to multiple classes of analgesics:     Smoking Abstinence (404)  Patient is current smoker (cigarette, pipe, e-cig, marijuanna): No  Elective Surgery:   Abstinence instructions provided prior to day of surgery:   Patient abstained from smoking on day of surgery:     Pre-Op Beta-Blocker in Isolated CABG (44)  Isolated CABG AND patient age >= 18: No  Beta-blocker admin within 24 hours of surgical incision:   Exception:of medical reason(s) for not administering beta blocker within 24 hours prior to surgical incision (e.g., not  indicated,other medical reason):     PACU assessment of acute postoperative pain prior to Anesthesia Care End- Applies to Patients Age = 18- (ABG7)  Initial PACU pain score is which of the following: < 7/10  Patient unable to report pain score: N/A    Post-anesthetic transfer of care checklist/protocol to PACU/ICU- (426 and 427)  Upon conclusion of case, patient transferred to which of the following locations: PACU/Non-ICU  Use of transfer checklist/protocol: Yes  Exclusion: Service Performed in Patient Hospital Room (and thus did not require transfer): N/A  Unplanned admission to ICU related to anesthesia service up through end of PACU care- (MD51)  Unplanned admission to ICU (not initially anticipated at anesthesia start time): No

## 2020-10-12 NOTE — LACTATION NOTE
"This note was copied from a baby's chart.  Baby 39.4 weeks, , baby 16 hours old. MOB reports she \"did not try hard to breastfeed with first baby\". Baby swaddled in crib, FOB changed diaper and baby placed STS, baby cueing LC positioned baby at breast and easily obtained deep latch x 5 minutes- see latch assessment score. Demo on hand expression & spoon feed back, easily expressed 6 drops then spoon fed back. Encouraged hand expression & spoon feeding back in addition to breastfeeding 4-5 times during day until milk supply comes in. MOB watching [employer] hand expression video.    Teaching on hunger cues, breastfeeding when baby cues or by 4 hours from last feed, importance of skin to skin, positioning baby at breast nipple to nose & cluster feeding.    Injoy card given and downloaded. MOB plans to sign up with WIC, MOB had WIC with 1st baby.     Breastfeeding plan:  Breastfeed on cue a minimum 8x/24 hours or by 4 hours from last feed. Hand express & spoon feed back in addition to breastfeeding 4-5 times during day until milk supply comes in.       "

## 2020-10-12 NOTE — ANESTHESIA PROCEDURE NOTES
Epidural Block    Date/Time: 10/11/2020 5:20 PM  Performed by: Eileen Parrish M.D.  Authorized by: Eileen Parrish M.D.     Patient Location:  OB  Start Time:  10/11/2020 5:20 PM  End Time:  10/11/2020 5:25 PM  Reason for Block: labor analgesia    patient identified, IV checked, site marked, risks and benefits discussed, surgical consent, monitors and equipment checked, pre-op evaluation and timeout performed    Patient Position:  Sitting  Prep: ChloraPrep, patient draped and sterile technique    Monitoring:  Blood pressure, continuous pulse oximetry and heart rate  Approach:  Midline  Location:  L3-L4  Injection Technique:  CARTER saline  Skin infiltration:  Lidocaine  Strength:  1%  Dose:  3ml  Needle Type:  Tuohy  Needle Gauge:  17 G  Needle Length:  3.5 in  Loss of resistance::  6.5  Catheter Size:  19 G  Catheter at Skin Depth:  12  Test Dose Result:  Negative   Uneventful, single pass. Pt tolerated placement well.

## 2020-10-12 NOTE — PROGRESS NOTES
0900-- Received report from LUBA Warren, Infant at bedside in open crib no signs of distress.  Pt resting in bed. Discussed pain management for the day.  No further needs at the time.  Call light within reach, bed locked and in lowest position.  Rounding in place.    0930-- Assessment completed, VSS, Pt declines PRN pain medication at this time.  Pt asked about starting to pump.  Talked about hand expression vs pumping.  Notified PATRICIA Aguirre RN of plan.  Discussed plan of care for the day that pt is comfortable with.  All questions answered at this time.  Will continue to monitor.

## 2020-10-12 NOTE — ANESTHESIA TIME REPORT
Anesthesia Start and Stop Event Times     Date Time Event    10/11/2020 1715 Anesthesia Start     2037 Anesthesia Stop        Responsible Staff  10/11/20    Name Role Begin End    Eileen Parrish M.D. Anesth 1715 2037        Preop Diagnosis (Free Text):  Pre-op Diagnosis             Preop Diagnosis (Codes):    Post op Diagnosis  Pregnancy      Premium Reason  E. Weekend    Comments:

## 2020-10-12 NOTE — ANESTHESIA PREPROCEDURE EVALUATION
19 yo  at 39-7 requesting epidural for labor    Relevant Problems   Other   (+) Obesity       Physical Exam    Airway   Mallampati: II  TM distance: >3 FB  Neck ROM: full       Cardiovascular - normal exam  Rhythm: regular  Rate: normal  (-) murmur     Dental - normal exam           Pulmonary - normal exam  Breath sounds clear to auscultation     Abdominal    Neurological - normal exam                 Anesthesia Plan    ASA 2       Plan - epidural   Neuraxial block will be labor analgesia              Pertinent diagnostic labs and testing reviewed    Informed Consent:    Anesthetic plan and risks discussed with patient.

## 2020-10-12 NOTE — PROCEDURES
SPONTANEOUS VAGINAL DELIVERY PROCEDURE NOTE    PATIENT ID:  NAME:  Jodee Marin  MRN:               [id number]  YOB: 2000    Labor Course: Patient was admitted to Labor and Delivery at 39w4d for induction of labor due to elective.      On 10/11/2020  at 20:37, this 20 y.o., now  39w4d , GBS negtive female delivered via  under epidural anesthesia a viable male infant weight pending skin to skin transition with APGAR scores of 8 and 8 at one and five minutes. The infant head delivered in direct OA with restitution to LOT. There was no nuchal cord and the anterior shoulder and rest of body delivered with slight downward traction. The vigorous infant was placed on mothers abdomen and delayed cord clamping occurred. Afterward, the cord was doubly clamped, cut and infant repositioned to mothers chest. An intact placenta was delivered spontaneously at 20:39 with 3 vessel cord. Upon vaginal exam, there was an intact perineum. Estimated blood loss was 300cc. Patient will be transferred to postpartum in stable condition and infant to  nursery.      Delivery attended by Kala Palumbo CNM, APRN.

## 2020-10-13 VITALS
OXYGEN SATURATION: 97 % | BODY MASS INDEX: 37.16 KG/M2 | RESPIRATION RATE: 18 BRPM | DIASTOLIC BLOOD PRESSURE: 71 MMHG | HEART RATE: 71 BPM | SYSTOLIC BLOOD PRESSURE: 108 MMHG | HEIGHT: 66 IN | TEMPERATURE: 97.3 F | WEIGHT: 231.2 LBS

## 2020-10-13 PROCEDURE — 700102 HCHG RX REV CODE 250 W/ 637 OVERRIDE(OP): Performed by: STUDENT IN AN ORGANIZED HEALTH CARE EDUCATION/TRAINING PROGRAM

## 2020-10-13 PROCEDURE — 700102 HCHG RX REV CODE 250 W/ 637 OVERRIDE(OP): Performed by: NURSE PRACTITIONER

## 2020-10-13 PROCEDURE — A9270 NON-COVERED ITEM OR SERVICE: HCPCS | Performed by: STUDENT IN AN ORGANIZED HEALTH CARE EDUCATION/TRAINING PROGRAM

## 2020-10-13 PROCEDURE — A9270 NON-COVERED ITEM OR SERVICE: HCPCS | Performed by: NURSE PRACTITIONER

## 2020-10-13 RX ORDER — IBUPROFEN 600 MG/1
600 TABLET ORAL EVERY 6 HOURS PRN
Qty: 30 TAB | Refills: 0 | Status: SHIPPED | OUTPATIENT
Start: 2020-10-13 | End: 2021-10-05

## 2020-10-13 RX ADMIN — PRENATAL WITH FERROUS FUM AND FOLIC ACID 1 TABLET: 3080; 920; 120; 400; 22; 1.84; 3; 20; 10; 1; 12; 200; 27; 25; 2 TABLET ORAL at 08:43

## 2020-10-13 RX ADMIN — IBUPROFEN 600 MG: 600 TABLET, FILM COATED ORAL at 08:43

## 2020-10-13 RX ADMIN — DOCUSATE SODIUM 100 MG: 100 CAPSULE, LIQUID FILLED ORAL at 08:43

## 2020-10-13 ASSESSMENT — PATIENT HEALTH QUESTIONNAIRE - PHQ9
2. FEELING DOWN, DEPRESSED, IRRITABLE, OR HOPELESS: NOT AT ALL
SUM OF ALL RESPONSES TO PHQ9 QUESTIONS 1 AND 2: 0
1. LITTLE INTEREST OR PLEASURE IN DOING THINGS: NOT AT ALL

## 2020-10-13 NOTE — PROGRESS NOTES
1130-  Discharge teaching reviewed with pt, all questions answered.  Pt has all written information on self and infant care, including prescriptions,  screening slip and information, and follow-up instructions.  Pt will call when she is ready for car seat check.     1136- Infant checked in car seat by this RN.  Pt discharged walking with hospital escort.

## 2020-10-13 NOTE — CARE PLAN
Problem: Altered physiologic condition related to immediate post-delivery state and potential for bleeding/hemorrhage  Goal: Patient physiologically stable as evidenced by normal lochia, palpable uterine involution and vital signs within normal limits  Outcome: PROGRESSING AS EXPECTED  Intervention: Perform physical assessment and obtain vital signs on patient as directed in Intrapartum/Postpartum Standard of Care in Policy and Procedure manual  Note: Fundus firm, lochia light/scant without clots, pt performing david care independently     Problem: Potential for postpartum infection related to presence of episiotomy/vaginal tear and/or uterine contamination  Goal: Patient will be absent from signs and symptoms of infection  Outcome: PROGRESSING AS EXPECTED  Intervention: Monitor vital signs and assess patient as directed in Intrapartum/Postpartum Standard of Care in Policy and Procedure manual  Note: Vitals stable, afebrile, no s/s infection at this time

## 2020-10-13 NOTE — PROGRESS NOTES
0700-- Received report from LUBA Arellano, Infant at bedside in open crib no signs of distress.  Pt resting in bed. Discussed pain management for the day.  No further needs at the time.  Call light within reach, bed locked and in lowest position.  Rounding in place.    0815-- Assessment completed, VSS, Pt declines PRN pain medication at this time.  Discussed plan of care for the day that pt is comfortable with.  All questions answered at this time.  Will continue to monitor.

## 2020-10-13 NOTE — DISCHARGE SUMMARY
Discharge Summary:      Jodee Marin    Admit Date:   10/11/2020  Discharge Date:  10/13/2020     Admitting diagnosis:  Pregnancy  Pregnancy  Discharge Diagnosis: Status post vaginal, spontaneous.  Pregnancy Complications: none  Tubal Ligation:  no        History:  History reviewed. No pertinent past medical history.  OB History    Para Term  AB Living   2 2 2     2   SAB TAB Ectopic Molar Multiple Live Births           0 2      # Outcome Date GA Lbr [place]/2nd Weight Sex Delivery Anes PTL Lv   2 Term 10/11/20 39w4d / 00:07 3.75 kg (8 lb 4.3 oz) M Vag-Spont EPI N N[place]STOR   1 Term 19 39w2d  3.595 kg (7 lb 14.8 oz) M Vag-Spont EPI N N[place]ST[place]        Patient has no known allergies.  Patient Active Problem List    Diagnosis Date Noted   • Obesity 10/11/2020   • Supervision of other high risk pregnancies, third trimester 2020   • Vaginal bleeding in pregnancy, third trimester 2020        Hospital Course:   20 y.o. , now para 2, was admitted with the above mentioned diagnosis, underwent Active Labor, vaginal, spontaneous. Patient postpartum course was unremarkable, with progressive advancement in diet , ambulation and toleration of oral analgesia. Patient without complaints today and desires discharge.      Vitals:    10/12/20 0047 10/12/20 0600 10/12/20 1752 10/13/20 0532   BP: 117/61 113/65 127/65 108/71   Pulse: 73 67 60 71   Resp: 18 18 16 18   Temp: 36.9 °C (98.4 °F) 36.1 °C (97 °F) 36.4 °C (97.6 °F) 36.3 °C (97.3 °F)   TempSrc: Temporal Temporal Temporal Temporal   SpO2:   97% 97%   Weight:       Height:           Current Facility-Administered Medications   Medication Dose   • LR infusion     • measles, mumps and rubella vaccine (MMR) injection 0.5 mL  0.5 mL   • PRN oxytocin (PITOCIN) (20 Units/1000 mL) PRN for excessive uterine bleeding - See Admin Instr  125-999 mL/hr   • miSOPROStol (CYTOTEC) tablet 600 mcg  600 mcg   • methylergonovine (METHERGINE) injection 0.2 mg  0.2 mg   •  carboPROST (HEMABATE) injection 250 mcg  250 mcg   • docusate sodium (COLACE) capsule 100 mg  100 mg   • prenatal plus vitamin (STUARTNATAL 1+1) 27-1 MG tablet 1 Tab  1 Tab   • oxytocin (PITOCIN) infusion (for postpartum)   mL/hr   • ibuprofen (MOTRIN) tablet 600 mg  600 mg   • acetaminophen (TYLENOL) tablet 325 mg  325 mg       Exam:  Breast Exam: negative  Abdomen: Abdomen soft, non-tender. BS normal. No masses,  No organomegaly  Fundus Non Tender: yes  Incision: none  Perineum: perineum intact  Extremity: extremities, peripheral pulses and reflexes normal     Labs:  Recent Labs     10/11/20  1630 10/12/20  0519   WBC 8.4 10.9*   RBC 4.18* 3.95*   HEMOGLOBIN 12.7 11.8*   HEMATOCRIT 37.4 35.1*   MCV 89.5 88.9   MCH 30.4 29.9   MCHC 34.0 33.6   RDW 44.8 44.4   PLATELETCT 198 158*   MPV 10.4 10.3        Activity:   Discharge to home  Pelvic Rest x 6 weeks    Assessment:  normal postpartum course  Discharge Assessment: No areas of skin breakdown/redness; surgical incision intact/healing     Follow up: .C or [facility] in 5 weeks for vaginal ; 1 week for incision check.      Discharge Meds:   Current Outpatient Medications   Medication Sig Dispense Refill   • ibuprofen (MOTRIN) 600 MG Tab Take 1 Tab by mouth every 6 hours as needed (For cramping after delivery; do not give if patient is receiving ketorolac (Toradol)). 30 Tab 0       Cj Meyer, PHerbertA.

## 2020-10-13 NOTE — LACTATION NOTE
This note was copied from a baby's chart.  Mom is a 21 y/o P2 who delivered baby boy weighing 6#15.6 oz at 39.4 wks. Mom currently has an 11 month old at home whom she pumped breastmilk for one month. Mom states she casually   pumped and did not make a full supply.Mom reports darker and enlarged areolas during pregnancy. Mom denies any breast surgeries, diabetes, thyroid or fertility issues.  Mom states he puts baby to breast briefly then gives half or almost full bottle and feels baby  Is not getting enough at breast. Reviewed supply and demand, skin to skin in helping with milk production, stomach size of baby and discussed the Supplemental Feeding volumes guidelines.    Mom tells LC her feeding plan will be to pump and offer a bottle to baby. Mom has an electric pump at home. LC discussed importance or stimulation of breasts by double pumping routinely 8-10x/24 hrs for optimal milk production, performing hand massage for few minutes prior to pumping session.      Mom verbalizes understanding of eduction. Has her INJOY melina downloaded.    Provided handouts for outpatient breastfeeding support [place] and phone number for private lactation consults

## 2020-10-13 NOTE — CARE PLAN
Problem: Safety  Goal: Will remain free from injury  Outcome: PROGRESSING AS EXPECTED  Note: Pt re-educated about use of call light if any assistance is needed. Pt. Is able to ambulate without any assistance at this time.      Problem: Infection  Goal: Will remain free from infection  Outcome: PROGRESSING AS EXPECTED  Note: Infant remains afebrile at this time.  Infant shows no other s/s of infection.

## 2020-10-13 NOTE — DISCHARGE INSTRUCTIONS
POSTPARTUM DISCHARGE INSTRUCTIONS FOR MOM    YOB: 2000   Age: 20 y.o.               Admit Date: 10/11/2020     Discharge Date: 10/13/2020  Attending Doctor:  Han Angulo M.D.                  Allergies:  Patient has no known allergies.    Discharged to home by car. Discharged via wheelchair, hospital escort: Yes.  Special equipment needed: Not Applicable  Belongings with: Personal  Be sure to schedule a follow-up appointment with your primary care doctor or any specialists as instructed.     Discharge Plan:   Diet Plan: Discussed  Activity Level: Discussed  Confirmed Follow up Appointment: Patient to Call and Schedule Appointment  Confirmed Symptoms Management: Discussed  Medication Reconciliation Updated: Yes  Influenza Vaccine Indication: Not indicated: Previously immunized this influenza season and > 8 years of age    REASONS TO CALL YOUR OBSTETRICIAN:  1.   Persistent fever or shaking chills (Temperature higher than 100.4)  2.   Heavy bleeding (soaking more than 1 pad per hour); Passing clots  3.   Foul odor from vagina  4.   Mastitis (Breast infection; breast pain, chills, fever, redness)  5.   Urinary pain, burning or frequency  6.   Episiotomy infection  8.   Severe depression longer than 24 hours    HAND WASHING  · Prior to handling the baby.  · Before breastfeeding or bottle feeding baby.  · After using the bathroom or changing the baby's diaper.    VAGINAL CARE  · Nothing inside vagina for 6 weeks: no sexual intercourse, tampons or douching.  · Bleeding may continue for 2-4 weeks.  Amount may vary.    · Call your physician for heavy bleeding which means soaking more than 1 pad per hour    BIRTH CONTROL  · It is possible to become pregnant at any time after delivery and while breastfeeding.  · Plan to discuss a method of birth control with your physician at your follow up visit. visit.    DIET AND ELIMINATION  · Eating more fiber (bran cereal, fruits, and vegetables) and drinking plenty of  "fluids will help to avoid constipation.  · Urinary frequency after childbirth is normal.    POSTPARTUM BLUES  During the first few days after birth, you may experience a sense of the \"blues\" which may include impatience, irritability or even crying.  These feeling come and go quickly.  However, as many as 1 in 10 women experience emotional symptoms known as postpartum depression.    Postpartum depression:  May start as early as the second or third day after delivery or take several weeks or months to develop.  Symptoms of \"blues\" are present, but are more intense:  Crying spells; loss of appetite; feelings of hopelessness or loss of control; fear of touching the baby; over concern or no concern at all about the baby; little or no concern about your own appearance/caring for yourself; and/or inability to sleep or excessive sleeping.  Contact your physician if you are experiencing any of these symptoms.    Crisis Hotline:  · [place] Crisis Hotline:  2-514-DVPRVTV  Or 1-851.194.7958  · [place] Crisis Hotline:  1-140.318.4220  Or 609-014-2345    PREVENTING SHAKEN BABY:  If you are angry or stressed, PUT THE BABY IN THE CRIB, step away, take some deep breaths, and wait until you are calm to care for the baby.  DO NOT SHAKE THE BABY.  You are not alone, call a supporter for help.    · Crisis Call Center 24/7 crisis line 019-243-0495 or 1-235.173.8823  · You can also text them, text \"ANSWER\" to 102346    QUIT SMOKING/TOBACCO USE:  I understand the use of any tobacco products increases my chance of suffering from future heart disease and could cause other illnesses which may shorten my life. Quitting the use of tobacco products is the single most important thing I can do to improve my health. For further information on smoking / tobacco cessation call a Toll Free Quit Line at 1-442.223.4085 (*National Cancer [place]) or 1-714.699.4624 (American Lung Association) or you can access the web based program at " www.lungusa.org.    · [place] Tobacco Users Help Line:  (128) 262-5033       Toll Free: 1-205.596.4827  · Quit Tobacco Program [facility] (388)444-1223    DEPRESSION / SUICIDE RISK:  As you are discharged from this [facility], it is important to learn how to keep safe from harming yourself.    Recognize the warning signs:  · Abrupt changes in personality, positive or negative- including increase in energy   · Giving away possessions  · Change in eating patterns- significant weight changes-  positive or negative  · Change in sleeping patterns- unable to sleep or sleeping all the time   · Unwillingness or inability to communicate  · Depression  · Unusual sadness, discouragement and loneliness  · Talk of wanting to die  · Neglect of personal appearance   · Rebelliousness- reckless behavior  · Withdrawal from people/activities they love  · Confusion- inability to concentrate     If you or a loved one observes any of these behaviors or has concerns about self-harm, here's what you can do:  · Talk about it- your feelings and reasons for harming yourself  · Remove any means that you might use to hurt yourself (examples: pills, rope, extension cords, firearm)  · Get professional help from the community (Mental Health, Substance Abuse, psychological counseling)  · Do not be alone:Call your Safe Contact- someone whom you trust who will be there for you.  · Call your local CRISIS HOTLINE 046-5265 or 903-282-1333  · Call your local Children's Mobile Crisis Response Team [place] (708) 756-9640 or www.[employer]  · Call the toll free National Suicide Prevention Hotlines   · National Suicide Prevention Lifeline 781-376-DRPD (6414)  · National Hope Line Network 800-SUICIDE (248-4197)    DISCHARGE SURVEY:  Thank you for choosing [facility].  We hope we provided you with very good care.  You may be receiving a survey in the mail.  Please fill it out.  Your opinion is valuable to  us.    ADDITIONAL EDUCATIONAL MATERIALS GIVEN TO PATIENT:        My signature on this form indicates that:  1.  I have reviewed and understand the above information  2.  My questions regarding this information have been answered to my satisfaction.  3.  I have formulated a plan with my discharge nurse to obtain my prescribed medication for home.

## 2020-12-15 ENCOUNTER — ROUTINE PRENATAL (OUTPATIENT)
Dept: OBGYN | Facility: CLINIC | Age: 20
End: 2020-12-15
Payer: COMMERCIAL

## 2020-12-15 VITALS — WEIGHT: 206 LBS | DIASTOLIC BLOOD PRESSURE: 72 MMHG | BODY MASS INDEX: 33.27 KG/M2 | SYSTOLIC BLOOD PRESSURE: 119 MMHG

## 2020-12-15 DIAGNOSIS — Z30.430 ENCOUNTER FOR IUD INSERTION: ICD-10-CM

## 2020-12-15 LAB
INT CON NEG: NORMAL
INT CON POS: NORMAL
POC URINE PREGNANCY TEST: NEGATIVE

## 2020-12-15 PROCEDURE — 81025 URINE PREGNANCY TEST: CPT | Performed by: OBSTETRICS & GYNECOLOGY

## 2020-12-15 PROCEDURE — 90050 PR POSTPARTUM VISIT: CPT | Mod: 25 | Performed by: OBSTETRICS & GYNECOLOGY

## 2020-12-15 PROCEDURE — 58300 INSERT INTRAUTERINE DEVICE: CPT | Performed by: OBSTETRICS & GYNECOLOGY

## 2020-12-15 ASSESSMENT — EDINBURGH POSTNATAL DEPRESSION SCALE (EPDS)
I HAVE FELT SCARED OR PANICKY FOR NO GOOD REASON: NO, NOT AT ALL
I HAVE BLAMED MYSELF UNNECESSARILY WHEN THINGS WENT WRONG: NO, NEVER
TOTAL SCORE: 0
THINGS HAVE BEEN GETTING ON TOP OF ME: NO, I HAVE BEEN COPING AS WELL AS EVER
I HAVE BEEN ANXIOUS OR WORRIED FOR NO GOOD REASON: NO, NOT AT ALL
I HAVE FELT SAD OR MISERABLE: NO, NOT AT ALL
I HAVE LOOKED FORWARD WITH ENJOYMENT TO THINGS: AS MUCH AS I EVER DID
I HAVE BEEN SO UNHAPPY THAT I HAVE HAD DIFFICULTY SLEEPING: NOT AT ALL
I HAVE BEEN ABLE TO LAUGH AND SEE THE FUNNY SIDE OF THINGS: AS MUCH AS I ALWAYS COULD
I HAVE BEEN SO UNHAPPY THAT I HAVE BEEN CRYING: NO, NEVER
THE THOUGHT OF HARMING MYSELF HAS OCCURRED TO ME: NEVER

## 2020-12-15 NOTE — PROGRESS NOTES
Jodee Marin is a 20 y.o. female who presents for her Postpartum Exam      HPI: Pt presents for postpartum exam s/p vaginal, spontaneous on 10/11/2020. Patient is without complaints.  Baby doing well.  Breast/bottle feeding.  No depression/anxiety.  Not sexually active.  Lochia resolved.  Desires Mirena IUD for contraception.  EDPS score: 0  Last pap: Never    Review of Systems:   Pertinent positives documented in HPI and all other systems reviewed & are negative    Physical exam:   Vital measurements:  There were no vitals taken for this visit.  There is no height or weight on file to calculate BMI. (Goal BMI>18 <25)  Nursing note and vitals reviewed.  Gen: She is oriented to person, place, and time. She appears well-developed and well-nourished. No distress.   Heart: Heart regular rate and rhythm  Lungs: clear to auscultation bilaterally  Breasts: nontender, not engorged, no dominant masses, nipples intact  Abdomen: soft, nontender, nondistended, no rebound/guarding  Extremities: nontender, no clubbing, cyanosis or edema  Pelvic: Normal external female genitalia, well-healed perineum from delivery, cervix is normal, uterus approximately 7 weeks in size non tender, adnexa bilaterally normal with no dominant masses    A/P: Postpartum status post vaginal, spontaneous  Doing well without complaints  Continue prenatal vitamins if breast feeding  May resume normal activities including exercise, tampons, and intercourse  Discussed various methods of contraception with the patient including OCs, ring, patch, DepoProvera, IUD, Nexplanon, and tubal ligation. Also discussed barrier method and rhythm methods.   Contraception Mirena IUD placed today   Follow up in 4-6 wks for string check

## 2020-12-15 NOTE — PROGRESS NOTES
Pt here for PP visit. reports no problem. Desires Depo as contraception. Pt is bottle feeding.   Good phone# [phone]  Pharmacy verified with pt.

## 2020-12-15 NOTE — PROCEDURES
20 y.o.    Female presents here today for her IUD insertion:     Patient's last menstrual period was 2020.      Today the patient is counseled on the risks of IUD insertion. I also discussed with the patient the risk of infection on insertion, and had asked the patient to remain on pelvic rest for one week following the insertion. We also discussed the risk of IUD expulsion, the risk of uterine perforation and IUD migration. If the IUD does migrate the patient may require a separate procedure such as a laparoscopy to retrieve the migrated IUD. I also discussed the 1% risk of pregnancy with IUD use. Also discussed with patient today increased risk of ectopic pregnancy with IUD use. Discussed specific side effects of ParaGard IUD which can be increased vaginal bleeding during menses or increased dysmenorrhea. Also discussed today the possibility that IUD may need to be removed secondary to bleeding profile or pain. Also discussed were the possibility that partner can feel the IUD during intercourse. I also discussed the side effects of Mirena which can be amenorrhea or dysfunctional uterine bleeding or spotting.  Patient had the opportunity to ask questions regarding insertion, risks and benefits, all questions are answered in their entirety.  Informed consent is signed.    Procedure note  Urine pregnancy test is negative, informed consent was previously signed  The bimanual exam is performed the uterus is noted to be 7 weeks in size and is mid position  A speculum was inserted into the vagina, the cervix was cleansed with Betadine swabs x3  Tenaculum was placed on the anterior lip of the cervix  The uterus was sounded to a depth of 6 centimeters  The IUD is placed under sterile conditions, without complications  The strings trimmed to approximately 3 cm  Tenaculum was removed from the cervix and hemostasis was achieved with silver nitrate  The patient tolerated the procedure well    Aftercare discussed.  Patient is asked to refrain from coitus or use backup method for 7 days after insertion. She is to return for fever, worsening pelvic pain, abdominal pain, syncope, unusually heavy vaginal bleeding, suspected expulsion, foul smelling vaginal discharge, or pregnancy-like symptoms. If the patient is comfortable she may also perform a digital self examination to check for the strings, although this is not required.    Patient is asked to follow up in 4 to 6 weeks for IUD check.

## 2021-01-19 ENCOUNTER — GYNECOLOGY VISIT (OUTPATIENT)
Dept: OBGYN | Facility: CLINIC | Age: 21
End: 2021-01-19
Payer: COMMERCIAL

## 2021-01-19 VITALS — BODY MASS INDEX: 32.34 KG/M2 | SYSTOLIC BLOOD PRESSURE: 120 MMHG | WEIGHT: 200.3 LBS | DIASTOLIC BLOOD PRESSURE: 62 MMHG

## 2021-01-19 DIAGNOSIS — Z97.5 IUD (INTRAUTERINE DEVICE) IN PLACE: ICD-10-CM

## 2021-01-19 DIAGNOSIS — Z30.431 IUD CHECK UP: ICD-10-CM

## 2021-01-19 PROBLEM — O09.893 SUPERVISION OF OTHER HIGH RISK PREGNANCIES, THIRD TRIMESTER: Status: RESOLVED | Noted: 2020-08-07 | Resolved: 2021-01-19

## 2021-01-19 PROBLEM — O46.93 VAGINAL BLEEDING IN PREGNANCY, THIRD TRIMESTER: Status: RESOLVED | Noted: 2020-08-07 | Resolved: 2021-01-19

## 2021-01-19 PROCEDURE — 99212 OFFICE O/P EST SF 10 MIN: CPT | Performed by: OBSTETRICS & GYNECOLOGY

## 2021-01-19 NOTE — NON-PROVIDER
Pt is here for IUD string check  Good phone #:[phone]  Pharmacy verified.  Pt states still having light spotting since the insertion on 12/15/2020.  Pt states no other complaints for today.

## 2021-01-19 NOTE — PROGRESS NOTES
Subjective:      Jodee Marin is a 20 y.o. female who presents for  Follow-Up (IUD string check)            HPI patient is a 20-year-old G2, P2 who presents today for IUD surveillance status post Mirena IUD insertion on 12/15/2020.  She is doing well currently.  Patient states she has had a lot of spotting after IUD placement but she has noticed that her spotting is becoming less frequent.  She reports no pelvic pain or cramping.  Denies any dyspareunia.  Reports normal bowel and bladder functions.  Denies depression symptoms.    ROS all organ systems were reviewed and were negative except for complaints in HPI       Objective:     /62 (BP Location: Left arm, Patient Position: Sitting, BP Cuff Size: Adult)   Wt 90.9 kg (200 lb 4.8 oz)   BMI 32.34 kg/m²      Physical Exam  Vitals signs and nursing note reviewed. Exam conducted with a chaperone present.   Constitutional:       General: She is not in acute distress.     Appearance: Normal appearance. She is obese. She is not toxic-appearing.   HENT:      Head: Normocephalic and atraumatic.   Neck:      Musculoskeletal: Normal range of motion and neck supple. No neck rigidity.   Cardiovascular:      Rate and Rhythm: Normal rate and regular rhythm.      Pulses: Normal pulses.      Heart sounds: Normal heart sounds. No murmur. No gallop.    Pulmonary:      Effort: Pulmonary effort is normal. No respiratory distress.      Breath sounds: Normal breath sounds. No wheezing.   Abdominal:      General: Abdomen is flat. Bowel sounds are normal. There is no distension.      Palpations: Abdomen is soft.      Tenderness: There is no abdominal tenderness.   Genitourinary:     General: Normal vulva.      Labia:         Right: No rash, tenderness, lesion or injury.         Left: No rash, tenderness, lesion or injury.       Vagina: Vaginal discharge (Small amount of pinkish discharge present in the vaginal vault) present. No tenderness or bleeding.      Cervix: No  cervical motion tenderness, friability or erythema.      Uterus: Not enlarged and not tender.       Adnexa:         Right: No mass, tenderness or fullness.          Left: No mass or tenderness.        Rectum: No external hemorrhoid.         Musculoskeletal: Normal range of motion.      Right lower leg: No edema.      Left lower leg: No edema.   Lymphadenopathy:      Lower Body: No right inguinal adenopathy. No left inguinal adenopathy.   Skin:     General: Skin is warm and dry.      Findings: No lesion.   Neurological:      General: No focal deficit present.      Mental Status: She is alert and oriented to person, place, and time.      Gait: Gait normal.   Psychiatric:         Mood and Affect: Mood normal.         Thought Content: Thought content normal.         Judgment: Judgment normal.                 Assessment/Plan:        1. IUD check up  20-year-old G2, P2 here for IUD surveillance.  Mirena IUD is in place  Patient encouraged to do IUD string checks at home  I discussed potential bleeding changes with IUD.  She states spotting is becoming less frequent  Safe sex and backup contraception discussed  We discussed Mirena IUD and potential bleeding changes      2. IUD (intrauterine device) in place  IUD is in place    3.  Precautions and plan of care were reviewed.  Diet exercise and self breast exam reviewed.  Patient to follow-up in 1 year for annual gynecologic exam or as needed for any gynecologic concerns including any abnormal bleeding/bleeding problems

## 2021-03-30 ENCOUNTER — OFFICE VISIT (OUTPATIENT)
Dept: MEDICAL GROUP | Facility: MEDICAL CENTER | Age: 21
End: 2021-03-30
Payer: COMMERCIAL

## 2021-03-30 VITALS
OXYGEN SATURATION: 97 % | TEMPERATURE: 98.1 F | HEIGHT: 66 IN | BODY MASS INDEX: 32.37 KG/M2 | RESPIRATION RATE: 18 BRPM | HEART RATE: 67 BPM | WEIGHT: 201.39 LBS | SYSTOLIC BLOOD PRESSURE: 130 MMHG | DIASTOLIC BLOOD PRESSURE: 85 MMHG

## 2021-03-30 DIAGNOSIS — E66.09 CLASS 1 OBESITY DUE TO EXCESS CALORIES WITHOUT SERIOUS COMORBIDITY WITH BODY MASS INDEX (BMI) OF 32.0 TO 32.9 IN ADULT: ICD-10-CM

## 2021-03-30 DIAGNOSIS — R41.840 DIFFICULTY CONCENTRATING: ICD-10-CM

## 2021-03-30 DIAGNOSIS — D64.9 ANEMIA, UNSPECIFIED TYPE: ICD-10-CM

## 2021-03-30 DIAGNOSIS — Z13.220 SCREENING FOR LIPID DISORDERS: ICD-10-CM

## 2021-03-30 PROBLEM — E66.811 CLASS 1 OBESITY DUE TO EXCESS CALORIES WITHOUT SERIOUS COMORBIDITY WITH BODY MASS INDEX (BMI) OF 32.0 TO 32.9 IN ADULT: Status: ACTIVE | Noted: 2020-10-11

## 2021-03-30 PROCEDURE — 99214 OFFICE O/P EST MOD 30 MIN: CPT | Performed by: FAMILY MEDICINE

## 2021-03-30 ASSESSMENT — ENCOUNTER SYMPTOMS
SHORTNESS OF BREATH: 0
SENSORY CHANGE: 0
NERVOUS/ANXIOUS: 0
ABDOMINAL PAIN: 0
DIARRHEA: 0
PALPITATIONS: 0
FEVER: 0
VOMITING: 0
CHILLS: 0
WEAKNESS: 0
NAUSEA: 0
DEPRESSION: 0
COUGH: 0
CONSTIPATION: 0

## 2021-03-30 ASSESSMENT — PATIENT HEALTH QUESTIONNAIRE - PHQ9: CLINICAL INTERPRETATION OF PHQ2 SCORE: 0
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
## 2021-03-30 NOTE — ASSESSMENT & PLAN NOTE
This is a chronic health problem for this patient.  Eats healthy diet as per patient, is physically active.
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
## 2021-03-30 NOTE — LETTER
[facility]  Emery Adam M.D.  [address]  [place] [address]  Fax: [phone]   Authorization for Release/Disclosure of   Protected Health Information   Name: TEJ MCDERMOTT : 2000 SSN: [id number]   Address: [address]  [address]  [place] [address] Phone:    [phone] (home)    I authorize the entity listed below to release/disclose the PHI below to:   [facility]/Emery Adam M.D. and Emery Adam M.D.   Provider or Entity Name:  [facility]    Address   City, State, Zip   Phone:      Fax:     Reason for request: continuity of care   Information to be released:    [  ] LAST COLONOSCOPY,  including any PATH REPORT and follow-up  [  ] LAST FIT/COLOGUARD RESULT [  ] LAST DEXA  [  ] LAST MAMMOGRAM  [  ] LAST PAP  [  ] LAST LABS [  ] RETINA EXAM REPORT  [  ] IMMUNIZATION RECORDS  [  ] Release all info      [  ] Check here and initial the line next to each item to release ALL health information INCLUDING  _____ Care and treatment for drug and / or alcohol abuse  _____ HIV testing, infection status, or AIDS  _____ Genetic Testing    DATES OF SERVICE OR TIME PERIOD TO BE DISCLOSED: _____________  I understand and acknowledge that:  * This Authorization may be revoked at any time by you in writing, except if your health information has already been used or disclosed.  * Your health information that will be used or disclosed as a result of you signing this authorization could be re-disclosed by the recipient. If this occurs, your re-disclosed health information may no longer be protected by State or Federal laws.  * You may refuse to sign this Authorization. Your refusal will not affect your ability to obtain treatment.  * This Authorization becomes effective upon signing and will  on (date) __________.      If no date is indicated, this Authorization will  one (1) year from the signature date.    Name: Tej Mcdermott    Signature:   Date:          3/30/2021       PLEASE FAX REQUESTED RECORDS BACK TO: [phone]

## 2021-03-30 NOTE — ASSESSMENT & PLAN NOTE
This is a chronic health problem for this patient.  Recent labs on 10/20/2020 showed hemoglobin at 11.8.  Is currently not taking any multivitamins.

## 2021-04-13 ENCOUNTER — GYNECOLOGY VISIT (OUTPATIENT)
Dept: OBGYN | Facility: CLINIC | Age: 21
End: 2021-04-13
Payer: COMMERCIAL

## 2021-04-13 VITALS — DIASTOLIC BLOOD PRESSURE: 65 MMHG | SYSTOLIC BLOOD PRESSURE: 120 MMHG | WEIGHT: 199 LBS | BODY MASS INDEX: 32.12 KG/M2

## 2021-04-13 DIAGNOSIS — Z30.09 FAMILY PLANNING: ICD-10-CM

## 2021-04-13 PROCEDURE — 99213 OFFICE O/P EST LOW 20 MIN: CPT | Performed by: OBSTETRICS & GYNECOLOGY

## 2021-04-13 NOTE — PROGRESS NOTES
Chief complaint;    Jodee Marin is a 20 y.o.  who presents to discuss her family-planning options     Patient is status post  6 months ago not nursing  Had Mirena IUD placed 4 months ago and having difficulty losing weight in fact gaining weight and having significant mood swings  Is here to talk about other family-planning options.  The patient has used Depo-Provera in the past and liked it        Review of systems; denies fever chills abdominal pain, denies chest pain shortness of breath or urinary symptoms  Past medical history-  Past Medical History:   Diagnosis Date   • Anemia 3/30/2021     Past surgical history-History reviewed. No pertinent surgical history.  Allergies-Patient has no known allergies.  Medications-  Current Outpatient Medications on File Prior to Visit   Medication Sig Dispense Refill   • ibuprofen (MOTRIN) 600 MG Tab Take 1 Tab by mouth every 6 hours as needed (For cramping after delivery; do not give if patient is receiving ketorolac (Toradol)). 30 Tab 0     No current facility-administered medications on file prior to visit.     Social history-  Social History     Socioeconomic History   • Marital status: Single     Spouse name: Not on file   • Number of children: Not on file   • Years of education: Not on file   • Highest education level: Not on file   Occupational History   • Not on file   Tobacco Use   • Smoking status: Never Smoker   • Smokeless tobacco: Never Used   Substance and Sexual Activity   • Alcohol use: No   • Drug use: No   • Sexual activity: Yes     Partners: Male     Birth control/protection: I.U.D.     Comment: Mirena IUD.   Other Topics Concern   • Behavioral problems Not Asked   • Interpersonal relationships Not Asked   • Sad or not enjoying activities Not Asked   • Suicidal thoughts Not Asked   • Poor school performance Not Asked   • Reading difficulties Not Asked   • Speech difficulties Not Asked   • Writing difficulties Not Asked   • Inadequate sleep  Not Asked   • Excessive TV viewing Not Asked   • Excessive video game use Not Asked   • Inadequate exercise Not Asked   • Sports related Not Asked   • Poor diet Not Asked   • Family concerns for drug/alcohol abuse Not Asked   • Poor oral hygiene Not Asked   • Bike safety Not Asked   • Family concerns vehicle safety Not Asked   Social History Narrative   • Not on file     Social Determinants of Health     Financial Resource Strain:    • Difficulty of Paying Living Expenses:    Food Insecurity:    • Worried About Running Out of Food in the Last Year:    • Ran Out of Food in the Last Year:    Transportation Needs:    • Lack of Transportation (Medical):    • Lack of Transportation (Non-Medical):    Physical Activity:    • Days of Exercise per Week:    • Minutes of Exercise per Session:    Stress:    • Feeling of Stress :    Social Connections:    • Frequency of Communication with Friends and Family:    • Frequency of Social Gatherings with Friends and Family:    • Attends Yazdanism Services:    • Active Member of Clubs or Organizations:    • Attends Club or Organization Meetings:    • Marital Status:    Intimate Partner Violence:    • Fear of Current or Ex-Partner:    • Emotionally Abused:    • Physically Abused:    • Sexually Abused:      Past Family History-no history of breast or ovarian cancer    Physical examination;  Alert and oriented x3  General a thin well-developed well-nourished female in no apparent distress  Vitals:    04/13/21 1425   BP: 120/65   BP Location: Right arm   Patient Position: Sitting   Weight: 90.3 kg (199 lb)         Impression;  Family planning    Plan;  Family planning consultation:  Discussed all forms of birth control to include; barrier method-condoms or diaphragm, combination contraception including oral contraceptives, contraceptive patch contraceptive ring.  Long-acting progesterone-Depo-Provera, intrauterine device-both hormone-containing (Mirena, Theresa, Kyleena, Liletta) and  nonhormonal-ParaGard.  Specific risks regarding intrauterine device to include; infection, perforation of the uterus (with the need to perform laparoscopy in the operating room to retrieve IUD within the abdomen)  Possible irregular menstrual cycles to include amenorrhea, intermenstrual bleeding or heavier menstrual cycles.  Chronic pelvic pain from indwelling IUD necessitating removal and possible replacement  Possible pregnancy-removal of IUD if patient becomes pregnant may result in miscarriage  IUDs do not protect against STIs or ectopic pregnancy  All questions answered in detail    Referral placed for ParaGard IUD-in-depth counseling regarding breakthrough bleeding heavier menstrual cycles which may become better over time but may not.                25 minutes spent with the patient in face-to-face contact, 100% of the time spent on counseling and coordination of care. All questions answered in detail.

## 2021-04-13 NOTE — NON-PROVIDER
Pt here to discuss Birth control   Pt states has mirena IUD and is not comfortable with IUD and would like it taken out   Good#[phone]   Pharmacy verified

## 2021-05-18 ENCOUNTER — GYNECOLOGY VISIT (OUTPATIENT)
Dept: OBGYN | Facility: CLINIC | Age: 21
End: 2021-05-18
Payer: COMMERCIAL

## 2021-05-18 VITALS — DIASTOLIC BLOOD PRESSURE: 69 MMHG | SYSTOLIC BLOOD PRESSURE: 97 MMHG | BODY MASS INDEX: 31.64 KG/M2 | WEIGHT: 196 LBS

## 2021-05-18 DIAGNOSIS — Z30.433 ENCOUNTER FOR REMOVAL AND REINSERTION OF INTRAUTERINE CONTRACEPTIVE DEVICE (IUD): ICD-10-CM

## 2021-05-18 LAB
INT CON NEG: NORMAL
INT CON POS: NORMAL
POC URINE PREGNANCY TEST: NEGATIVE

## 2021-05-18 PROCEDURE — 58301 REMOVE INTRAUTERINE DEVICE: CPT | Mod: 59 | Performed by: OBSTETRICS & GYNECOLOGY

## 2021-05-18 PROCEDURE — 58300 INSERT INTRAUTERINE DEVICE: CPT | Performed by: OBSTETRICS & GYNECOLOGY

## 2021-05-18 PROCEDURE — 81025 URINE PREGNANCY TEST: CPT | Performed by: OBSTETRICS & GYNECOLOGY

## 2021-05-18 RX ORDER — COPPER 313.4 MG/1
1 INTRAUTERINE DEVICE INTRAUTERINE ONCE
Status: COMPLETED | OUTPATIENT
Start: 2021-05-18 | End: 2021-05-18

## 2021-05-18 RX ADMIN — COPPER 1 EACH: 313.4 INTRAUTERINE DEVICE INTRAUTERINE at 09:13

## 2021-05-18 NOTE — PROCEDURES
Procedure note; ParaGard IUD insertion and Mirena IUD removal    Informed consent obtained, consent signed-risks discussed include; risk of pain bleeding infection perforation of the uterus possible pregnancy possible irregular menstrual cycles including increased bleeding or amenorrhea. If IUD perforates the uterus, the patient will require laparoscopy to retrieve the IUD.IUD does not protect against STDs or ectopic pregnancy.    Pregnancy test negative    Bimanual exam reveals anteverted uterus nontender    Vaginal speculum placed    Betadine prep to cervix and vagina    Mirena IUD strings grasped and IUD removed intact    Single-toothed tenaculum placed on the anterior lip of the cervix    Uterus sounded-9 cm and anteverted    ParaGard IUD inserted without difficulty or complications    Strings trimmed    Patient tolerated procedure well    Followup in 2 weeks for IUD check up

## 2021-05-18 NOTE — NON-PROVIDER
Pt here for IUD insertion   Pt states no concerns  Good#[phone]   Pharmacy verified  UPT negative

## 2021-10-05 ENCOUNTER — OFFICE VISIT (OUTPATIENT)
Dept: URGENT CARE | Facility: CLINIC | Age: 21
End: 2021-10-05
Payer: COMMERCIAL

## 2021-10-05 VITALS
BODY MASS INDEX: 30.37 KG/M2 | DIASTOLIC BLOOD PRESSURE: 72 MMHG | RESPIRATION RATE: 16 BRPM | SYSTOLIC BLOOD PRESSURE: 104 MMHG | WEIGHT: 189 LBS | OXYGEN SATURATION: 96 % | TEMPERATURE: 97.7 F | HEART RATE: 74 BPM | HEIGHT: 66 IN

## 2021-10-05 DIAGNOSIS — J02.9 VIRAL PHARYNGITIS: ICD-10-CM

## 2021-10-05 LAB
EXTERNAL QUALITY CONTROL: NORMAL
INT CON NEG: NEGATIVE
INT CON POS: POSITIVE
S PYO AG THROAT QL: NEGATIVE
SARS-COV+SARS-COV-2 AG RESP QL IA.RAPID: NEGATIVE

## 2021-10-05 PROCEDURE — 87426 SARSCOV CORONAVIRUS AG IA: CPT | Performed by: PHYSICIAN ASSISTANT

## 2021-10-05 PROCEDURE — 87880 STREP A ASSAY W/OPTIC: CPT | Performed by: PHYSICIAN ASSISTANT

## 2021-10-05 PROCEDURE — 99213 OFFICE O/P EST LOW 20 MIN: CPT | Performed by: PHYSICIAN ASSISTANT

## 2021-10-05 ASSESSMENT — ENCOUNTER SYMPTOMS
FEVER: 0
SORE THROAT: 1
ABDOMINAL PAIN: 0
CHILLS: 0
WHEEZING: 0
NAUSEA: 0
SHORTNESS OF BREATH: 0
MYALGIAS: 0
SWOLLEN GLANDS: 0
DIARRHEA: 0
HEADACHES: 0
SPUTUM PRODUCTION: 0
COUGH: 0
PALPITATIONS: 0
VOMITING: 0

## 2021-10-05 NOTE — LETTER
October 5, 2021         Patient: oJdee Marin   YOB: 2000   Date of Visit: 10/5/2021           To Whom it May Concern:    Jodee Marin was seen in my clinic on 10/5/2021. She is able to return to work on 10/6/2021.    If you have any questions or concerns, please don't hesitate to call.        Sincerely,           Joy Sweeney P.A.-C.  Electronically Signed

## 2021-10-06 NOTE — PROGRESS NOTES
"Subjective     Jodee Marin is a 21 y.o. female who presents with Pharyngitis (runny nose started last night)            Pharyngitis   This is a new problem. The current episode started yesterday. The problem has been unchanged. There has been no fever. The pain is mild. Associated symptoms include congestion. Pertinent negatives include no abdominal pain, coughing, diarrhea, ear pain, headaches, shortness of breath, swollen glands or vomiting. She has had no exposure to strep. Treatments tried: OTC cough medication. Allergy medication. The treatment provided mild relief.     The patient is not vaccinated. She denies known COVID exposure.      Past Medical History:   Diagnosis Date   • Anemia 3/30/2021       No past surgical history on file.    Family History   Problem Relation Age of Onset   • No Known Problems Mother    • No Known Problems Father        No Known Allergies    Medications, Allergies, and current problem list reviewed today in Epic      Review of Systems   Constitutional: Negative for chills, fever and malaise/fatigue.   HENT: Positive for congestion and sore throat. Negative for ear pain.    Respiratory: Negative for cough, sputum production, shortness of breath and wheezing.    Cardiovascular: Negative for chest pain, palpitations and leg swelling.   Gastrointestinal: Negative for abdominal pain, diarrhea, nausea and vomiting.   Musculoskeletal: Negative for myalgias.   Neurological: Negative for headaches.     All other systems reviewed and are negative.            Objective     /72   Pulse 74   Temp 36.5 °C (97.7 °F) (Temporal)   Resp 16   Ht 1.676 m (5' 6\")   Wt 85.7 kg (189 lb)   SpO2 96%   BMI 30.51 kg/m²      Physical Exam  Constitutional:       General: She is not in acute distress.     Appearance: Normal appearance. She is not ill-appearing.   HENT:      Head: Normocephalic and atraumatic.      Right Ear: Tympanic membrane, ear canal and external ear normal.      Left " Ear: Tympanic membrane, ear canal and external ear normal.      Nose: Congestion and rhinorrhea present.      Mouth/Throat:      Mouth: Mucous membranes are moist.      Pharynx: Posterior oropharyngeal erythema present.   Eyes:      Conjunctiva/sclera: Conjunctivae normal.   Cardiovascular:      Rate and Rhythm: Normal rate and regular rhythm.      Heart sounds: Normal heart sounds. No murmur heard.     Pulmonary:      Effort: Pulmonary effort is normal. No respiratory distress.      Breath sounds: Normal breath sounds. No wheezing, rhonchi or rales.   Lymphadenopathy:      Cervical: No cervical adenopathy.   Skin:     General: Skin is warm and dry.      Findings: No rash.   Neurological:      General: No focal deficit present.      Mental Status: She is alert and oriented to person, place, and time.   Psychiatric:         Mood and Affect: Mood normal.         Behavior: Behavior normal.         Thought Content: Thought content normal.         Judgment: Judgment normal.                             Assessment & Plan        1. Viral pharyngitis  POCT Rapid Strep A    POCT SARS-COV Antigen RANDY (Symptomatic Only)       poct strep- negative  poct covid- negative.    Differential diagnoses, Supportive care, and indications for immediate follow-up discussed with patient.   Pathogenesis of diagnosis discussed including typical length and natural progression.   Instructed to return to clinic or nearest emergency department for any change in condition, further concerns, or worsening of symptoms.    The patient demonstrated a good understanding and agreed with the treatment plan.    Joy Sweeney P.A.-C.

## 2023-01-26 NOTE — DISCHARGE INSTRUCTIONS
The baby and pregnancy appear fine.  Drink plenty of fluids and rest but she can do normal activities.  Avoid vigorous exercise or sexual intercourse until all bleeding and pain stop.  Go to [facility] for heavy bleeding, significant abdominal pain, pain and fever or ill appearance.  
Home

## 2024-03-12 NOTE — PROGRESS NOTES
Pt here today for OB follow up @ 20w6d  Pt states denies VB and LOF  Reports +FM  Good # [phone]  Pharmacy Confirmed.       Start warm compress  Add bactrim BID x 7 days  F/u if no better or worse

## 2024-08-13 NOTE — LETTER
November 3, 2018         Patient: Jodee Marin   YOB: 2000   Date of Visit: 11/3/2018           To Whom it May Concern:    Jodee Marin was seen in my clinic on 11/3/2018.    Please excuse her from missed work today and yesterday.       If you have any questions or concerns, please don't hesitate to call.        Sincerely,           Joy Talley P.A.-C.  Electronically Signed     
(1) Other Diagnosis

## 2024-08-23 ENCOUNTER — APPOINTMENT (OUTPATIENT)
Dept: MEDICAL GROUP | Facility: PHYSICIAN GROUP | Age: 24
End: 2024-08-23

## 2024-09-13 ENCOUNTER — APPOINTMENT (OUTPATIENT)
Dept: MEDICAL GROUP | Facility: PHYSICIAN GROUP | Age: 24
End: 2024-09-13

## 2024-10-22 ENCOUNTER — HOSPITAL ENCOUNTER (OUTPATIENT)
Facility: MEDICAL CENTER | Age: 24
End: 2024-10-22
Attending: FAMILY MEDICINE
Payer: COMMERCIAL

## 2024-10-22 ENCOUNTER — APPOINTMENT (OUTPATIENT)
Dept: MEDICAL GROUP | Facility: PHYSICIAN GROUP | Age: 24
End: 2024-10-22

## 2024-10-22 VITALS
WEIGHT: 157.6 LBS | SYSTOLIC BLOOD PRESSURE: 112 MMHG | DIASTOLIC BLOOD PRESSURE: 60 MMHG | BODY MASS INDEX: 25.33 KG/M2 | TEMPERATURE: 98.5 F | HEIGHT: 66 IN

## 2024-10-22 DIAGNOSIS — F90.9 ATTENTION DEFICIT HYPERACTIVITY DISORDER (ADHD), UNSPECIFIED ADHD TYPE: ICD-10-CM

## 2024-10-22 DIAGNOSIS — Z11.59 NEED FOR HEPATITIS C SCREENING TEST: ICD-10-CM

## 2024-10-22 DIAGNOSIS — Z00.00 HEALTHCARE MAINTENANCE: ICD-10-CM

## 2024-10-22 PROCEDURE — 3074F SYST BP LT 130 MM HG: CPT | Performed by: FAMILY MEDICINE

## 2024-10-22 PROCEDURE — 99214 OFFICE O/P EST MOD 30 MIN: CPT | Performed by: FAMILY MEDICINE

## 2024-10-22 PROCEDURE — G0481 DRUG TEST DEF 8-14 CLASSES: HCPCS

## 2024-10-22 PROCEDURE — 3078F DIAST BP <80 MM HG: CPT | Performed by: FAMILY MEDICINE

## 2024-10-22 RX ORDER — DEXTROAMPHETAMINE SACCHARATE, AMPHETAMINE ASPARTATE, DEXTROAMPHETAMINE SULFATE AND AMPHETAMINE SULFATE 2.5; 2.5; 2.5; 2.5 MG/1; MG/1; MG/1; MG/1
5 TABLET ORAL DAILY
Qty: 45 TABLET | Refills: 0 | Status: SHIPPED | OUTPATIENT
Start: 2024-10-22 | End: 2025-01-20

## 2024-10-22 ASSESSMENT — ENCOUNTER SYMPTOMS
VOMITING: 0
PALPITATIONS: 0
ABDOMINAL PAIN: 0
SORE THROAT: 0
FEVER: 0
SHORTNESS OF BREATH: 0
NAUSEA: 0
CHILLS: 0
COUGH: 0

## 2024-10-22 ASSESSMENT — PATIENT HEALTH QUESTIONNAIRE - PHQ9: CLINICAL INTERPRETATION OF PHQ2 SCORE: 0

## 2024-10-23 DIAGNOSIS — F90.9 ATTENTION DEFICIT HYPERACTIVITY DISORDER (ADHD), UNSPECIFIED ADHD TYPE: ICD-10-CM

## 2024-10-27 LAB
1OH-MIDAZOLAM UR QL SCN: NOT DETECTED
6MAM UR QL: NOT DETECTED
7AMINOCLONAZEPAM UR QL: NOT DETECTED
A-OH ALPRAZ UR QL: NOT DETECTED
ALPRAZ UR QL: NOT DETECTED
AMPHET UR QL SCN: NOT DETECTED
ANNOTATION COMMENT IMP: NORMAL
BARBITURATES UR QL: NEGATIVE
BUPRENORPHINE UR QL: NOT DETECTED
BZE UR QL: NEGATIVE
CARBOXYTHC UR QL: NEGATIVE
CARISOPRODOL UR QL: NEGATIVE
CLONAZEPAM UR QL: NOT DETECTED
CODEINE UR QL: NOT DETECTED
CREAT UR-MCNC: 94.8 MG/DL (ref 20–400)
DIAZEPAM UR QL: NOT DETECTED
ETHYL GLUCURONIDE UR QL: NEGATIVE
FENTANYL UR QL: NOT DETECTED
GABAPENTIN UR QL CFM: NOT DETECTED
HYDROCODONE UR QL: NOT DETECTED
HYDROMORPHONE UR QL: NOT DETECTED
LORAZEPAM UR QL: NOT DETECTED
MDA UR QL: NOT DETECTED
MDEA UR QL: NOT DETECTED
MDMA UR QL: NOT DETECTED
ME-PHENIDATE UR QL: NOT DETECTED
METHADONE UR QL: NEGATIVE
METHAMPHET UR QL: NOT DETECTED
MIDAZOLAM UR QL SCN: NOT DETECTED
MORPHINE UR QL: NOT DETECTED
NALOXONE UR QL CFM: NOT DETECTED
NORBUPRENORPHINE UR QL CFM: NOT DETECTED
NORDIAZEPAM UR QL: NOT DETECTED
NORFENTANYL UR QL: NOT DETECTED
NORHYDROCODONE UR QL CFM: NOT DETECTED
NORMEPERIDINE UR QL CFM: NOT DETECTED
NOROXYCODONE UR QL CFM: NOT DETECTED
NOROXYMORPHONE UR QL SCN: NOT DETECTED
OXAZEPAM UR QL: NOT DETECTED
OXYCODONE UR QL: NOT DETECTED
OXYMORPHONE UR QL: NOT DETECTED
PATHOLOGY STUDY: NORMAL
PCP UR QL: NEGATIVE
PHENTERMINE UR QL: NOT DETECTED
PREGABALIN UR QL CFM: NOT DETECTED
SERVICE CMNT-IMP: NORMAL
TAPENTADOL UR QL SCN: NOT DETECTED
TAPENTADOL UR QL SCN: NOT DETECTED
TEMAZEPAM UR QL: NOT DETECTED
TRAMADOL UR QL: NEGATIVE
ZOLPIDEM PHENYL-4-CARB UR QL SCN: NOT DETECTED
ZOLPIDEM UR QL: NOT DETECTED

## 2024-12-06 ENCOUNTER — HOSPITAL ENCOUNTER (OUTPATIENT)
Dept: LAB | Facility: MEDICAL CENTER | Age: 24
End: 2024-12-06
Attending: FAMILY MEDICINE
Payer: COMMERCIAL

## 2024-12-06 DIAGNOSIS — Z11.59 NEED FOR HEPATITIS C SCREENING TEST: ICD-10-CM

## 2024-12-06 DIAGNOSIS — Z00.00 HEALTHCARE MAINTENANCE: ICD-10-CM

## 2024-12-06 LAB
ALBUMIN SERPL BCP-MCNC: 4.7 G/DL (ref 3.2–4.9)
ALBUMIN/GLOB SERPL: 1.8 G/DL
ALP SERPL-CCNC: 42 U/L (ref 30–99)
ALT SERPL-CCNC: 12 U/L (ref 2–50)
ANION GAP SERPL CALC-SCNC: 11 MMOL/L (ref 7–16)
AST SERPL-CCNC: 21 U/L (ref 12–45)
BILIRUB SERPL-MCNC: 1.7 MG/DL (ref 0.1–1.5)
BUN SERPL-MCNC: 16 MG/DL (ref 8–22)
CALCIUM ALBUM COR SERPL-MCNC: 9.4 MG/DL (ref 8.5–10.5)
CALCIUM SERPL-MCNC: 10 MG/DL (ref 8.5–10.5)
CHLORIDE SERPL-SCNC: 104 MMOL/L (ref 96–112)
CHOLEST SERPL-MCNC: 156 MG/DL (ref 100–199)
CO2 SERPL-SCNC: 23 MMOL/L (ref 20–33)
CREAT SERPL-MCNC: 1.07 MG/DL (ref 0.5–1.4)
ERYTHROCYTE [DISTWIDTH] IN BLOOD BY AUTOMATED COUNT: 42.3 FL (ref 35.9–50)
FASTING STATUS PATIENT QL REPORTED: NORMAL
GFR SERPLBLD CREATININE-BSD FMLA CKD-EPI: 74 ML/MIN/1.73 M 2
GLOBULIN SER CALC-MCNC: 2.6 G/DL (ref 1.9–3.5)
GLUCOSE SERPL-MCNC: 84 MG/DL (ref 65–99)
HCT VFR BLD AUTO: 40 % (ref 37–47)
HCV AB SER QL: NONREACTIVE
HDLC SERPL-MCNC: 62 MG/DL
HGB BLD-MCNC: 13.5 G/DL (ref 12–16)
LDLC SERPL CALC-MCNC: 85 MG/DL
MCH RBC QN AUTO: 30.7 PG (ref 27–33)
MCHC RBC AUTO-ENTMCNC: 33.8 G/DL (ref 32.2–35.5)
MCV RBC AUTO: 90.9 FL (ref 81.4–97.8)
PLATELET # BLD AUTO: 224 K/UL (ref 164–446)
PMV BLD AUTO: 9.9 FL (ref 9–12.9)
POTASSIUM SERPL-SCNC: 4.1 MMOL/L (ref 3.6–5.5)
PROT SERPL-MCNC: 7.3 G/DL (ref 6–8.2)
RBC # BLD AUTO: 4.4 M/UL (ref 4.2–5.4)
SODIUM SERPL-SCNC: 138 MMOL/L (ref 135–145)
TRIGL SERPL-MCNC: 44 MG/DL (ref 0–149)
TSH SERPL DL<=0.005 MIU/L-ACNC: 3.33 UIU/ML (ref 0.38–5.33)
WBC # BLD AUTO: 8.3 K/UL (ref 4.8–10.8)

## 2024-12-06 PROCEDURE — 80061 LIPID PANEL: CPT

## 2024-12-06 PROCEDURE — 86803 HEPATITIS C AB TEST: CPT

## 2024-12-06 PROCEDURE — 85027 COMPLETE CBC AUTOMATED: CPT

## 2024-12-06 PROCEDURE — 80053 COMPREHEN METABOLIC PANEL: CPT

## 2024-12-06 PROCEDURE — 84443 ASSAY THYROID STIM HORMONE: CPT

## 2024-12-06 PROCEDURE — 36415 COLL VENOUS BLD VENIPUNCTURE: CPT

## 2024-12-06 PROCEDURE — 83036 HEMOGLOBIN GLYCOSYLATED A1C: CPT

## 2024-12-07 LAB
EST. AVERAGE GLUCOSE BLD GHB EST-MCNC: 103 MG/DL
HBA1C MFR BLD: 5.2 % (ref 4–5.6)

## 2025-01-21 ENCOUNTER — OFFICE VISIT (OUTPATIENT)
Dept: MEDICAL GROUP | Facility: PHYSICIAN GROUP | Age: 25
End: 2025-01-21
Payer: COMMERCIAL

## 2025-01-21 VITALS
HEART RATE: 58 BPM | DIASTOLIC BLOOD PRESSURE: 54 MMHG | BODY MASS INDEX: 23.95 KG/M2 | TEMPERATURE: 97.4 F | WEIGHT: 149 LBS | SYSTOLIC BLOOD PRESSURE: 98 MMHG | HEIGHT: 66 IN | OXYGEN SATURATION: 98 %

## 2025-01-21 DIAGNOSIS — N18.2 CHRONIC KIDNEY DISEASE, STAGE 2, MILDLY DECREASED GFR: ICD-10-CM

## 2025-01-21 DIAGNOSIS — F90.9 ATTENTION DEFICIT HYPERACTIVITY DISORDER (ADHD), UNSPECIFIED ADHD TYPE: ICD-10-CM

## 2025-01-21 PROCEDURE — 3074F SYST BP LT 130 MM HG: CPT | Performed by: FAMILY MEDICINE

## 2025-01-21 PROCEDURE — 99213 OFFICE O/P EST LOW 20 MIN: CPT | Performed by: FAMILY MEDICINE

## 2025-01-21 PROCEDURE — 3078F DIAST BP <80 MM HG: CPT | Performed by: FAMILY MEDICINE

## 2025-01-21 RX ORDER — DEXTROAMPHETAMINE SACCHARATE, AMPHETAMINE ASPARTATE, DEXTROAMPHETAMINE SULFATE AND AMPHETAMINE SULFATE 1.25; 1.25; 1.25; 1.25 MG/1; MG/1; MG/1; MG/1
5 TABLET ORAL DAILY
Qty: 90 TABLET | Refills: 0 | Status: SHIPPED | OUTPATIENT
Start: 2025-01-21 | End: 2025-01-23 | Stop reason: SDUPTHER

## 2025-01-21 ASSESSMENT — FIBROSIS 4 INDEX: FIB4 SCORE: 0.65

## 2025-01-21 ASSESSMENT — PATIENT HEALTH QUESTIONNAIRE - PHQ9: CLINICAL INTERPRETATION OF PHQ2 SCORE: 0

## 2025-01-21 NOTE — PROGRESS NOTES
"Verbal consent was acquired by the patient to use [employer] ambient listening note generation during this visit.    Subjective:     HPI:   History of Present Illness  The patient is a 24-year-old female who presents today as an established patient for follow-up. She was last evaluated approximately 3 months ago. She is currently prescribed Adderall 5 mg daily and reports no significant side effects or adverse reactions. She has been on this medication chronically and has tolerated it well. She requests a refill for her Adderall at this time. She is also due for cervical cancer screening and chlamydia and gonorrhea screening.    The patient reports that the half-tablet dosage of Adderall, which she has been taking for approximately one month, was ineffective in managing her symptoms. However, upon increasing the dosage to a full tablet, she noted a significant improvement. She administers the medication once daily. She does not experience any sleep disturbances, tachycardia, arrhythmias, hyperhidrosis, or overstimulation. The medication has been beneficial in enhancing her focus and overall productivity.    She recently underwent a Pap smear and an ultrasound performed by her gynecologist due to concerns regarding her intrauterine device (IUD) placement, which was causing intermittent pain. The results were normal, and the IUD strings were visualized. She typically consults with Dr. Mckeon at [facility]. Concurrently, she was screened for chlamydia and gonorrhea. Her IUD is expected to remain in place for an additional 3 to 5 years.    MEDICATIONS  Adderall    Health Maintenance: Completed    Objective:     Exam:  BP 98/54 (BP Location: Right arm, Patient Position: Sitting, BP Cuff Size: Adult)   Pulse (!) 58   Temp 36.3 °C (97.4 °F) (Temporal)   Ht 1.676 m (5' 6\")   Wt 67.6 kg (149 lb)   SpO2 98%   BMI 24.05 kg/m²  Body mass index is 24.05 kg/m².    Physical Exam  Vitals reviewed.   Constitutional:     "   Appearance: Normal appearance.   HENT:      Head: Normocephalic and atraumatic.      Right Ear: External ear normal.      Left Ear: External ear normal.      Nose: Nose normal.   Cardiovascular:      Rate and Rhythm: Normal rate and regular rhythm.      Pulses: Normal pulses.      Heart sounds: Normal heart sounds. No murmur heard.  Pulmonary:      Effort: Pulmonary effort is normal. No respiratory distress.      Breath sounds: Normal breath sounds. No wheezing.   Abdominal:      General: Abdomen is flat.      Palpations: Abdomen is soft.   Skin:     General: Skin is warm and dry.   Neurological:      Mental Status: She is alert and oriented to person, place, and time.   Psychiatric:         Mood and Affect: Mood normal.         Behavior: Behavior normal.             Results  Laboratory Studies  Kidney function slightly low for age. Sodium, potassium, electrolytes, liver function normal. Bilirubin slightly abnormal. Hep C negative. Cholesterol levels normal. A1c in normal range. Thyroid function normal. Complete blood counts normal.    Imaging  Ultrasound showed IUD in correct position.    Assessment & Plan:     1. Attention deficit hyperactivity disorder (ADHD), unspecified ADHD type  amphetamine-dextroamphetamine (ADDERALL) 5 MG Tab      2. Chronic kidney disease, stage 2, mildly decreased GFR  Comp Metabolic Panel          Assessment & Plan  1. Medication management.  She is currently on Adderall 5 mg daily and reports no significant side effects. She has been on this medication chronically and has tolerated it well. A prescription for Adderall 5 mg orally once daily for 90 days (90 tablets) will be sent to her pharmacy at [facility]. She is up to date with her controlled substance agreement and urine drug screen, which are required annually.    2. Health maintenance.  Her recent lab results, including kidney function, sodium, potassium, electrolytes, liver function, bilirubin, cholesterol, A1c,  thyroid, and complete blood count, were reviewed. Kidney function is slightly low for her age, which could be due to dehydration or anti-inflammatory use. Bilirubin was slightly abnormal but not concerning. Hepatitis C test was negative. Cholesterol levels were excellent, and A1c was within the normal range, indicating no prediabetes or diabetes. Thyroid function was normal, and previous anemia and increased white blood cell count have resolved. She had a Pap smear and chlamydia and gonorrhea screening done about a month ago, which were normal. She is advised to stay well-hydrated before the test. A repeat lab test will be ordered in March 2025 to monitor kidney function and bilirubin levels. The Pap smear is recommended every 3 years at her age, and chlamydia and gonorrhea screenings are done yearly up to age 24.    Follow-up  The patient will follow up in 3 months.    PROCEDURE  The patient had an IUD insertion previously.        Return in about 3 months (around 4/21/2025) for Controlled substance.    Please note that this dictation was created using voice recognition software. I have made every reasonable attempt to correct obvious errors, but I expect that there are errors of grammar and possibly content that I did not discover before finalizing the note.    Lashonda Rachel MD  Family Medicine and Non - Operative Sports Medicine   [facility] [place]

## 2025-01-23 DIAGNOSIS — F90.9 ATTENTION DEFICIT HYPERACTIVITY DISORDER (ADHD), UNSPECIFIED ADHD TYPE: ICD-10-CM

## 2025-01-23 RX ORDER — DEXTROAMPHETAMINE SACCHARATE, AMPHETAMINE ASPARTATE, DEXTROAMPHETAMINE SULFATE AND AMPHETAMINE SULFATE 1.25; 1.25; 1.25; 1.25 MG/1; MG/1; MG/1; MG/1
5 TABLET ORAL DAILY
Qty: 90 TABLET | Refills: 0 | Status: SHIPPED | OUTPATIENT
Start: 2025-01-23 | End: 2025-04-23

## 2025-01-23 RX ORDER — DEXTROAMPHETAMINE SACCHARATE, AMPHETAMINE ASPARTATE, DEXTROAMPHETAMINE SULFATE AND AMPHETAMINE SULFATE 1.25; 1.25; 1.25; 1.25 MG/1; MG/1; MG/1; MG/1
5 TABLET ORAL DAILY
Qty: 90 TABLET | Refills: 0 | Status: SHIPPED | OUTPATIENT
Start: 2025-01-23 | End: 2025-01-23 | Stop reason: SDUPTHER

## 2025-04-04 ENCOUNTER — OFFICE VISIT (OUTPATIENT)
Dept: URGENT CARE | Facility: CLINIC | Age: 25
End: 2025-04-04
Payer: COMMERCIAL

## 2025-04-04 VITALS
OXYGEN SATURATION: 100 % | SYSTOLIC BLOOD PRESSURE: 102 MMHG | HEIGHT: 66 IN | DIASTOLIC BLOOD PRESSURE: 74 MMHG | WEIGHT: 158 LBS | RESPIRATION RATE: 12 BRPM | TEMPERATURE: 96.9 F | HEART RATE: 58 BPM | BODY MASS INDEX: 25.39 KG/M2

## 2025-04-04 DIAGNOSIS — J32.9 BACTERIAL SINUSITIS: ICD-10-CM

## 2025-04-04 DIAGNOSIS — B96.89 BACTERIAL SINUSITIS: ICD-10-CM

## 2025-04-04 PROCEDURE — 99213 OFFICE O/P EST LOW 20 MIN: CPT

## 2025-04-04 ASSESSMENT — ENCOUNTER SYMPTOMS
COUGH: 1
SORE THROAT: 1
SPUTUM PRODUCTION: 1
MYALGIAS: 1
FEVER: 0
HEADACHES: 1
SINUS PAIN: 1

## 2025-04-04 ASSESSMENT — FIBROSIS 4 INDEX: FIB4 SCORE: 0.65

## 2025-04-05 NOTE — PROGRESS NOTES
Subjective:     CHIEF COMPLAINT  Chief Complaint   Patient presents with    Cough     X1.5wk, diarrhea, cough, body aches, migraines, sore throat, runny nose       HPI  Jodee Marin is a very pleasant 24 y.o. female who presents with nasal congestion, fatigue, diarrhea, headaches, a sore throat, and a cough productive of phlegm.  Her symptoms have been present for 1.5 weeks.  She reports that the diarrhea has improved, but her headache, congestion, sore throat, and cough have persisted.  Her sore throat seems to worsen in the mornings.  She has noticed that her headache is primarily in the facial region and seems to worsen with movement such as tipping her head.  She had fevers at the onset of her symptoms that have since resolved.    REVIEW OF SYSTEMS  Review of Systems   Constitutional:  Positive for malaise/fatigue. Negative for fever.   HENT:  Positive for congestion, sinus pain and sore throat. Negative for ear pain.    Respiratory:  Positive for cough and sputum production.    Musculoskeletal:  Positive for myalgias.   Neurological:  Positive for headaches.       PAST MEDICAL HISTORY  Patient Active Problem List    Diagnosis Date Noted    ADHD 10/22/2024    Difficulty concentrating 03/30/2021    Anemia 03/30/2021    Class 1 obesity due to excess calories without serious comorbidity with body mass index (BMI) of 32.0 to 32.9 in adult 10/11/2020       SURGICAL HISTORY  patient denies any surgical history    ALLERGIES  No Known Allergies    CURRENT MEDICATIONS  Home Medications       Reviewed by Valentine Calzada P.A.-C. (Physician Assistant) on 04/04/25 at 1810  Med List Status: <None>     Medication Last Dose Status   amphetamine-dextroamphetamine (ADDERALL) 5 MG Tab Taking Active                    SOCIAL HISTORY  Social History     Tobacco Use    Smoking status: Never    Smokeless tobacco: Never   Vaping Use    Vaping status: Never Used   Substance and Sexual Activity    Alcohol use: Yes     Comment:  "occ    Drug use: No    Sexual activity: Yes     Partners: Male     Birth control/protection: I.U.D.     Comment: Mirena IUD.       FAMILY HISTORY  Family History   Problem Relation Age of Onset    No Known Problems Mother     No Known Problems Father           Objective:     VITAL SIGNS: /74 (BP Location: Left arm, Patient Position: Sitting, BP Cuff Size: Small adult)   Pulse (!) 58   Temp 36.1 °C (96.9 °F) (Temporal)   Resp 12   Ht 1.676 m (5' 6\")   Wt 71.7 kg (158 lb)   SpO2 100%   BMI 25.50 kg/m²     PHYSICAL EXAM  Physical Exam  Vitals reviewed.   Constitutional:       General: She is not in acute distress.     Appearance: Normal appearance. She is not ill-appearing or toxic-appearing.   HENT:      Head: Normocephalic and atraumatic.      Right Ear: Tympanic membrane, ear canal and external ear normal.      Left Ear: Tympanic membrane, ear canal and external ear normal.      Nose: Congestion present.      Right Turbinates: Swollen.      Left Turbinates: Swollen.      Right Sinus: No maxillary sinus tenderness or frontal sinus tenderness.      Left Sinus: No maxillary sinus tenderness or frontal sinus tenderness.      Comments: Green mucus present on turbinates.      Mouth/Throat:      Mouth: Mucous membranes are moist.      Pharynx: Posterior oropharyngeal erythema present. No oropharyngeal exudate.   Eyes:      Conjunctiva/sclera: Conjunctivae normal.      Pupils: Pupils are equal, round, and reactive to light.   Cardiovascular:      Rate and Rhythm: Regular rhythm. Bradycardia present.      Heart sounds: Normal heart sounds.   Pulmonary:      Effort: Pulmonary effort is normal. No respiratory distress.      Breath sounds: Normal breath sounds. No stridor. No wheezing, rhonchi or rales.   Skin:     General: Skin is warm and dry.      Coloration: Skin is not pale.   Neurological:      General: No focal deficit present.      Mental Status: She is alert and oriented to person, place, and time. "   Psychiatric:         Mood and Affect: Mood normal.       Assessment/Plan:     1. Bacterial sinusitis  - amoxicillin-clavulanate (AUGMENTIN) 875-125 MG Tab; Take 1 Tablet by mouth 2 times a day for 7 days.  Dispense: 14 Tablet; Refill: 0  -Flonase nasal spray OTC.  1 spray per nostril 1-2 times daily  -Sinus flushes using distilled water and saline  -Tylenol OTC as needed for discomfort  -Rest and hydrate  -Return to clinic if symptoms worsen or fail to resolve      MDM/Comments:  Patient has stable vital signs and is non-toxic appearing. Discussed supportive care with hydration, rest, Tylenol/Ibuprofen as needed.  Recommend use of Flonase for congestion and possible allergy component of symptoms.  Discussed use of saline nasal flushes using distilled water.  Discussed that sinus infections are typically viral, yet given duration of symptoms I suspect a bacterial origin of symptoms in this case.  Antibiotic treatment started.  Patient demonstrated understanding of treatment plan at this time and will RTC if symptoms worsen or fail to resolve.     Differential diagnosis, natural history, supportive care, and indications for immediate follow-up discussed. All questions answered. Patient agrees with the plan of care.    Follow-up as needed if symptoms worsen or fail to improve to PCP, Urgent care or Emergency Room.    I have personally reviewed prior external notes and test results pertinent to today's visit.  I have independently reviewed and interpreted all diagnostics ordered during this urgent care acute visit.   Discussed management options (risks,benefits, and alternatives to treatment). Pt expresses understanding and the treatment plan was agreed upon. Questions were encouraged and answered to pt's satisfaction.    Please note that this dictation was created using voice recognition software. I have made a reasonable attempt to correct obvious errors, but I expect that there are errors of grammar and possibly  content that I did not discover before finalizing the note.

## 2025-04-22 ENCOUNTER — OFFICE VISIT (OUTPATIENT)
Dept: MEDICAL GROUP | Facility: PHYSICIAN GROUP | Age: 25
End: 2025-04-22
Payer: COMMERCIAL

## 2025-04-22 VITALS
HEIGHT: 66 IN | SYSTOLIC BLOOD PRESSURE: 100 MMHG | HEART RATE: 68 BPM | BODY MASS INDEX: 23.69 KG/M2 | DIASTOLIC BLOOD PRESSURE: 62 MMHG | OXYGEN SATURATION: 96 % | WEIGHT: 147.4 LBS | TEMPERATURE: 97.8 F

## 2025-04-22 DIAGNOSIS — F90.9 ATTENTION DEFICIT HYPERACTIVITY DISORDER (ADHD), UNSPECIFIED ADHD TYPE: ICD-10-CM

## 2025-04-22 PROCEDURE — 99213 OFFICE O/P EST LOW 20 MIN: CPT | Performed by: FAMILY MEDICINE

## 2025-04-22 PROCEDURE — 3078F DIAST BP <80 MM HG: CPT | Performed by: FAMILY MEDICINE

## 2025-04-22 PROCEDURE — 3074F SYST BP LT 130 MM HG: CPT | Performed by: FAMILY MEDICINE

## 2025-04-22 RX ORDER — DEXTROAMPHETAMINE SACCHARATE, AMPHETAMINE ASPARTATE, DEXTROAMPHETAMINE SULFATE AND AMPHETAMINE SULFATE 1.25; 1.25; 1.25; 1.25 MG/1; MG/1; MG/1; MG/1
5 TABLET ORAL DAILY
Qty: 90 TABLET | Refills: 0 | Status: SHIPPED | OUTPATIENT
Start: 2025-04-22 | End: 2025-07-21

## 2025-04-22 ASSESSMENT — FIBROSIS 4 INDEX: FIB4 SCORE: 0.65

## 2025-04-22 NOTE — PROGRESS NOTES
"Verbal consent was acquired by the patient to use [employer] ambient listening note generation during this visit.    Subjective:     HPI:   History of Present Illness  The patient presents for a medication refill and requests a continuation of Adderall.    The patient reports a positive response to the current regimen of Adderall 5 mg daily, with no adverse effects noted. Specifically, the patient denies experiencing tachycardia or insomnia. Furthermore, there is no reported diminution in the medication's efficacy during the midday period.    Health Maintenance: Completed    Objective:     Exam:  /62 (BP Location: Right arm, Patient Position: Sitting, BP Cuff Size: Adult)   Pulse 68   Temp 36.6 °C (97.8 °F) (Temporal)   Ht 1.676 m (5' 6\")   Wt 66.9 kg (147 lb 6.4 oz)   SpO2 96%   BMI 23.79 kg/m²  Body mass index is 23.79 kg/m².    Physical Exam  Vitals reviewed.   Constitutional:       Appearance: Normal appearance.   HENT:      Head: Normocephalic and atraumatic.      Right Ear: External ear normal.      Left Ear: External ear normal.      Nose: Nose normal.   Cardiovascular:      Rate and Rhythm: Normal rate and regular rhythm.      Pulses: Normal pulses.      Heart sounds: Normal heart sounds. No murmur heard.  Pulmonary:      Effort: Pulmonary effort is normal. No respiratory distress.      Breath sounds: Normal breath sounds. No wheezing.   Abdominal:      General: Abdomen is flat.      Palpations: Abdomen is soft.   Skin:     General: Skin is warm and dry.   Neurological:      Mental Status: She is alert and oriented to person, place, and time.   Psychiatric:         Mood and Affect: Mood normal.         Behavior: Behavior normal.             Results      Assessment & Plan:     1. Attention deficit hyperactivity disorder (ADHD), unspecified ADHD type  amphetamine-dextroamphetamine (ADDERALL) 5 MG Tab          Assessment & Plan  1. Attention Deficit Hyperactivity Disorder (ADHD)  - Reports current " dose of Adderall 5 mg daily is effective and does not wear off midday.  - No side effects such as tachycardia or trouble sleeping have been noted.  - Vital signs are within normal limits, with a heart rate of 68 and slightly low blood pressure.  - [facility]    Follow-up in 3 months.          Return in about 3 months (around 7/22/2025) for Controlled substance.    Please note that this dictation was created using voice recognition software. I have made every reasonable attempt to correct obvious errors, but I expect that there are errors of grammar and possibly content that I did not discover before finalizing the note.    Lashonda Rachel MD  Family Medicine and Non - Operative Sports Medicine   [facility] [place]

## 2025-07-29 ENCOUNTER — OFFICE VISIT (OUTPATIENT)
Dept: MEDICAL GROUP | Facility: PHYSICIAN GROUP | Age: 25
End: 2025-07-29
Payer: COMMERCIAL

## 2025-07-29 VITALS
OXYGEN SATURATION: 100 % | BODY MASS INDEX: 24.53 KG/M2 | TEMPERATURE: 98.8 F | RESPIRATION RATE: 14 BRPM | HEIGHT: 66 IN | WEIGHT: 152.6 LBS | SYSTOLIC BLOOD PRESSURE: 112 MMHG | DIASTOLIC BLOOD PRESSURE: 60 MMHG | HEART RATE: 66 BPM

## 2025-07-29 DIAGNOSIS — F90.9 ATTENTION DEFICIT HYPERACTIVITY DISORDER (ADHD), UNSPECIFIED ADHD TYPE: Primary | ICD-10-CM

## 2025-07-29 DIAGNOSIS — R41.840 DIFFICULTY CONCENTRATING: ICD-10-CM

## 2025-07-29 RX ORDER — DEXTROAMPHETAMINE SACCHARATE, AMPHETAMINE ASPARTATE, DEXTROAMPHETAMINE SULFATE AND AMPHETAMINE SULFATE 1.25; 1.25; 1.25; 1.25 MG/1; MG/1; MG/1; MG/1
5 TABLET ORAL DAILY
Qty: 90 TABLET | Refills: 0 | Status: SHIPPED | OUTPATIENT
Start: 2025-07-29 | End: 2025-10-27

## 2025-07-29 RX ORDER — DEXTROAMPHETAMINE SACCHARATE, AMPHETAMINE ASPARTATE, DEXTROAMPHETAMINE SULFATE AND AMPHETAMINE SULFATE 2.5; 2.5; 2.5; 2.5 MG/1; MG/1; MG/1; MG/1
5 TABLET ORAL DAILY
COMMUNITY
End: 2025-07-29

## 2025-07-29 ASSESSMENT — FIBROSIS 4 INDEX: FIB4 SCORE: 0.65

## 2025-07-29 NOTE — PROGRESS NOTES
"Verbal consent was acquired by the patient to use [employer] ambient listening note generation during this visit.    Subjective:     HPI:   History of Present Illness  The patient presents for a routine examination.    She reports no current health complaints and confirms the efficacy of her medication regimen. She denies experiencing any episodes of tachycardia, sleep disturbances, or chest pain. Additionally, she confirms that she is not pregnant.    Health Maintenance: Completed    Objective:     Exam:  /60 (BP Location: Left arm, Patient Position: Sitting, BP Cuff Size: Adult)   Pulse 66   Temp 37.1 °C (98.8 °F) (Temporal)   Resp 14   Ht 1.676 m (5' 6\")   Wt 69.2 kg (152 lb 9.6 oz)   SpO2 100%   Breastfeeding No Comment: NOT BREASTFEEDING  BMI 24.63 kg/m²  Body mass index is 24.63 kg/m².    Physical Exam  Vitals reviewed.   Constitutional:       Appearance: Normal appearance.   HENT:      Head: Normocephalic and atraumatic.      Right Ear: External ear normal.      Left Ear: External ear normal.      Nose: Nose normal. No congestion or rhinorrhea.      Mouth/Throat:      Mouth: Mucous membranes are moist.   Eyes:      Extraocular Movements: Extraocular movements intact.      Pupils: Pupils are equal, round, and reactive to light.   Cardiovascular:      Rate and Rhythm: Normal rate and regular rhythm.      Pulses: Normal pulses.      Heart sounds: Normal heart sounds. No murmur heard.  Pulmonary:      Effort: Pulmonary effort is normal. No respiratory distress.      Breath sounds: Normal breath sounds. No wheezing.   Abdominal:      General: Abdomen is flat. Bowel sounds are normal. There is no distension.      Palpations: Abdomen is soft.      Tenderness: There is no abdominal tenderness.   Musculoskeletal:      Cervical back: Neck supple.   Skin:     General: Skin is warm and dry.      Capillary Refill: Capillary refill takes less than 2 seconds.   Neurological:      General: No focal deficit " present.      Mental Status: She is alert and oriented to person, place, and time.   Psychiatric:         Mood and Affect: Mood normal.         Behavior: Behavior normal.             Results      Assessment & Plan:     1. Attention deficit hyperactivity disorder (ADHD), unspecified ADHD type  amphetamine-dextroamphetamine (ADDERALL) 5 MG Tab      2. Difficulty concentrating            Assessment & Plan  1. Health maintenance.  - Blood pressure readings are within the normal range, and heart rate is commendably low.  - No issues with tachycardia, sleep, or chest pain reported.  - Urine drug screen planned for October 2025; CMP to monitor bilirubin levels can be done anytime.  - Remaining lab work deferred until December 2025.    2. ADHD  RX refilled for adderall 5 mg 1/day. UP to date on pain management screen and controlled substance agreement signed. F/up 3 mons for CS.      Return in about 3 months (around 10/29/2025) for Controlled substance.    Please note that this dictation was created using voice recognition software. I have made every reasonable attempt to correct obvious errors, but I expect that there are errors of grammar and possibly content that I did not discover before finalizing the note.    Lashonda Rachel MD  Family Medicine and Non - Operative Sports Medicine   [facility] [place]